# Patient Record
Sex: FEMALE | Race: WHITE | URBAN - METROPOLITAN AREA
[De-identification: names, ages, dates, MRNs, and addresses within clinical notes are randomized per-mention and may not be internally consistent; named-entity substitution may affect disease eponyms.]

---

## 2017-02-15 RX ORDER — OXYCODONE HYDROCHLORIDE AND ACETAMINOPHEN 5; 325 MG/1; MG/1
TABLET ORAL
Refills: 0 | COMMUNITY
Start: 2016-11-25 | End: 2017-02-24

## 2017-02-15 RX ORDER — CONJUGATED ESTROGENS 0.62 MG/G
CREAM VAGINAL
Qty: 30 G | Refills: 1 | Status: SHIPPED | OUTPATIENT
Start: 2017-02-15 | End: 2017-02-24

## 2017-02-15 RX ORDER — ETANERCEPT 50 MG/ML
50 SOLUTION SUBCUTANEOUS WEEKLY
COMMUNITY
Start: 2016-11-18 | End: 2018-02-28

## 2017-02-15 RX ORDER — OMEPRAZOLE 20 MG/1
20 CAPSULE, DELAYED RELEASE ORAL DAILY
COMMUNITY
Start: 2007-04-04 | End: 2017-02-24

## 2017-02-15 RX ORDER — TRIAMTERENE AND HYDROCHLOROTHIAZIDE 37.5; 25 MG/1; MG/1
TABLET ORAL DAILY
COMMUNITY
Start: 2008-07-21 | End: 2017-11-06

## 2017-02-15 RX ORDER — CYANOCOBALAMIN (VITAMIN B-12) 1000 MCG
1 TABLET, EXTENDED RELEASE ORAL 2 TIMES DAILY
COMMUNITY
Start: 2010-08-18

## 2017-02-15 RX ORDER — ESCITALOPRAM OXALATE 10 MG/1
10 TABLET ORAL DAILY
COMMUNITY
Start: 2007-04-04 | End: 2017-04-29

## 2017-02-15 RX ORDER — MULTIVITAMIN
1 CAPSULE ORAL DAILY
COMMUNITY
Start: 2010-09-03 | End: 2018-06-25 | Stop reason: ALTCHOICE

## 2017-02-15 RX ORDER — METOPROLOL SUCCINATE 25 MG/1
25 TABLET, EXTENDED RELEASE ORAL DAILY
COMMUNITY
Start: 2010-10-20 | End: 2017-04-29

## 2017-02-15 RX ORDER — POTASSIUM CHLORIDE 1.5 G/1.77G
20 POWDER, FOR SOLUTION ORAL DAILY
COMMUNITY
Start: 2011-10-28 | End: 2017-08-17

## 2017-02-24 ENCOUNTER — OFFICE VISIT (OUTPATIENT)
Dept: FAMILY MEDICINE CLINIC | Facility: CLINIC | Age: 62
End: 2017-02-24

## 2017-02-24 VITALS
OXYGEN SATURATION: 96 % | DIASTOLIC BLOOD PRESSURE: 72 MMHG | TEMPERATURE: 98 F | HEART RATE: 65 BPM | SYSTOLIC BLOOD PRESSURE: 130 MMHG

## 2017-02-24 DIAGNOSIS — J40 BRONCHITIS: Primary | ICD-10-CM

## 2017-02-24 PROCEDURE — 99214 OFFICE O/P EST MOD 30 MIN: CPT | Performed by: NURSE PRACTITIONER

## 2017-02-24 RX ORDER — AZITHROMYCIN 250 MG/1
TABLET, FILM COATED ORAL
Qty: 6 TABLET | Refills: 0 | Status: SHIPPED | OUTPATIENT
Start: 2017-02-24 | End: 2017-03-23 | Stop reason: ALTCHOICE

## 2017-02-24 RX ORDER — BENZONATATE 200 MG/1
200 CAPSULE ORAL 3 TIMES DAILY PRN
Qty: 30 CAPSULE | Refills: 1 | Status: SHIPPED | OUTPATIENT
Start: 2017-02-24 | End: 2017-03-23 | Stop reason: ALTCHOICE

## 2017-02-24 NOTE — PROGRESS NOTES
CHIEF COMPLAINT:   Patient presents with:  Cough      HPI:   Beti Johnson is a 57year old female who presents to clinic today with complaints of cough for 10 days - feeling some pressure, mild sob- not like when she had blood clot   Had a blood clot in Alcohol Use: No                 REVIEW OF SYSTEMS:   GENERAL: See HPI  SKIN: no unusual skin lesions or rashes  HEENT: See HPI  THYROID: normal size, no nodules  LUNGS: see HPI.   CARDIOVASCULAR: No chest pain, palpitations  GI: No N/V/C/D (ZITHROMAX Z-ELENA) 250 MG Oral Tab 6 tablet 0      Sig: Take two tablets by mouth today, then one tablet daily. benzonatate 200 MG Oral Cap 30 capsule 1      Sig: Take 1 capsule (200 mg total) by mouth 3 (three) times daily as needed for cough.

## 2017-02-24 NOTE — PATIENT INSTRUCTIONS
Rest, increase fluids, salt water gargles ,neti pot (use distilled water) or saline nasal spray, Mucinex, ok to take tessalon pearls as needed for cough suppression. Tylenol/Ibuprofen, follow up if symptoms persist or increase.

## 2017-03-06 ENCOUNTER — TELEPHONE (OUTPATIENT)
Dept: FAMILY MEDICINE CLINIC | Facility: CLINIC | Age: 62
End: 2017-03-06

## 2017-03-06 RX ORDER — ASPIRIN 325 MG
325 TABLET ORAL DAILY
COMMUNITY
End: 2017-03-29

## 2017-03-06 NOTE — TELEPHONE ENCOUNTER
Med list updated.     Future Appointments  Date Time Provider Syd Koch   3/29/2017 5:45 PM Vashti Vogel MD [facility] [place] [facility]   4/12/2017 5:30 PM Vashti Vogel MD [facility] [place] [facility]

## 2017-03-06 NOTE — TELEPHONE ENCOUNTER
Pt was seen for follow up on 1/18/17- was told to take Eliquis for 6 full months before starting ASA 325mg dose. Pt  Wanted to confirm- if that was once daily or BID? Chiquita Rivera Pt informed according to note- once per day.

## 2017-03-21 ENCOUNTER — TELEPHONE (OUTPATIENT)
Dept: FAMILY MEDICINE CLINIC | Facility: CLINIC | Age: 62
End: 2017-03-21

## 2017-03-21 NOTE — TELEPHONE ENCOUNTER
Pt stopped Eliquis end of Feb. Pt started ASA 325mg once daily starting 3/1.  Pt states she noticed some swelling of L lower leg after one wk   being on ASA, but this last wk pt L leg ( foot up to mid calf) with increased swelling, states leg is burning, al

## 2017-03-22 ENCOUNTER — TELEPHONE (OUTPATIENT)
Dept: FAMILY MEDICINE CLINIC | Facility: CLINIC | Age: 62
End: 2017-03-22

## 2017-03-22 NOTE — TELEPHONE ENCOUNTER
Pt with positive finding for DVT L leg- knee area- went to ER yesterday  Pt is back on Eliquis 10mg BID for one wk then will take 5mg BID. Pt states that swelling has receded slightly - no infection was found.   Pt currently has appt on 3/29-  Okay to wait

## 2017-03-23 ENCOUNTER — OFFICE VISIT (OUTPATIENT)
Dept: FAMILY MEDICINE CLINIC | Facility: CLINIC | Age: 62
End: 2017-03-23

## 2017-03-23 VITALS
DIASTOLIC BLOOD PRESSURE: 74 MMHG | OXYGEN SATURATION: 98 % | TEMPERATURE: 98 F | SYSTOLIC BLOOD PRESSURE: 128 MMHG | HEART RATE: 64 BPM | RESPIRATION RATE: 20 BRPM

## 2017-03-23 DIAGNOSIS — I82.412 ACUTE DEEP VEIN THROMBOSIS (DVT) OF FEMORAL VEIN OF LEFT LOWER EXTREMITY (HCC): Primary | ICD-10-CM

## 2017-03-23 PROCEDURE — 99213 OFFICE O/P EST LOW 20 MIN: CPT | Performed by: FAMILY MEDICINE

## 2017-03-23 RX ORDER — OMEPRAZOLE 20 MG/1
20 CAPSULE, DELAYED RELEASE ORAL EVERY MORNING
COMMUNITY
End: 2017-03-26

## 2017-03-23 RX ORDER — NABUMETONE 500 MG/1
500 TABLET, FILM COATED ORAL
COMMUNITY
End: 2017-03-29

## 2017-03-23 NOTE — PROGRESS NOTES
[facility] [place]  PROGRESS NOTE  Chief Complaint:   Patient presents with:  Hospital F/U: leg swelling, blood clot in left leg       HPI:   This is a 57year old female presents for ER follow up.  Patient was seen at [facility] ER on 3/21/17 du Diabetes Paternal Grandfather    • Cancer Paternal Grandfather    • Cancer Brother    • Diabetes Brother      Allergies:    Cefaclor                    Comment:Other reaction(s): vomiting , diarrhea  Keflex                      Comment:Other reaction(s): v or at rest.  RESPIRATORY:  Denies shortness of breath, wheezing, cough or sputum. GASTROINTESTINAL:  Denies abdominal pain, nausea, vomiting, constipation, diarrhea, or blood in stool.     MUSCULOSKELETAL:  Denies weakness, muscle aches, back pain, joint p f/u.    Diagnoses and all orders for this visit:    Acute deep vein thrombosis (DVT) of femoral vein of left lower extremity ([facility] [address]     patient will discuss hypercoagulable labs  with Dr. Patricia Sorensen next week. Patient Instructions   Continue with Eliquis.

## 2017-03-23 NOTE — PATIENT INSTRUCTIONS
Continue with Eliquis. Monitor for any sign of bruising. Call or go to ER if any chest pain, shortness of breath, or any increase in heart rate. Keep appointment with Dr Anneliese Hull next week.

## 2017-03-27 RX ORDER — POTASSIUM CHLORIDE 20 MEQ/1
TABLET, EXTENDED RELEASE ORAL
Qty: 90 TABLET | Refills: 0 | Status: SHIPPED | OUTPATIENT
Start: 2017-03-27 | End: 2017-03-29

## 2017-03-27 RX ORDER — OMEPRAZOLE 20 MG/1
CAPSULE, DELAYED RELEASE ORAL
Qty: 90 CAPSULE | Refills: 0 | Status: SHIPPED | OUTPATIENT
Start: 2017-03-27 | End: 2017-06-24

## 2017-03-29 ENCOUNTER — OFFICE VISIT (OUTPATIENT)
Dept: FAMILY MEDICINE CLINIC | Facility: CLINIC | Age: 62
End: 2017-03-29

## 2017-03-29 VITALS
TEMPERATURE: 98 F | RESPIRATION RATE: 16 BRPM | DIASTOLIC BLOOD PRESSURE: 74 MMHG | HEART RATE: 78 BPM | SYSTOLIC BLOOD PRESSURE: 128 MMHG

## 2017-03-29 DIAGNOSIS — D68.9 COAGULOPATHY (HCC): Primary | ICD-10-CM

## 2017-03-29 DIAGNOSIS — I82.432 ACUTE DEEP VEIN THROMBOSIS (DVT) OF POPLITEAL VEIN OF LEFT LOWER EXTREMITY (HCC): ICD-10-CM

## 2017-03-29 DIAGNOSIS — I26.99 PE (PULMONARY THROMBOEMBOLISM) (HCC): ICD-10-CM

## 2017-03-29 PROCEDURE — 99213 OFFICE O/P EST LOW 20 MIN: CPT | Performed by: FAMILY MEDICINE

## 2017-03-29 NOTE — PATIENT INSTRUCTIONS
Pt to consult Dr. Bowden Can re recurrent DVT.  And prior PE  Pt also needing preop for cataract    Pt to f.u with me after hematology eval

## 2017-03-29 NOTE — PROGRESS NOTES
Awais Navas is a 57year old female. HPI:   Patient presents for recheck of her DVT. Pt has been taking medications as instructed, no medication side effects. Pt with mild cough nagging headache, some fatigue now back on medication.  Is unsure she Comment x3 in L knee and x1 in R knee    OTHER      Comment Tendon repair to R hand      Social History:  Smoking: Denies     No results found for this or any previous visit.   REVIEW OF SYSTEMS:   GENERAL HEALTH: feels well otherwise  SKIN: denies any unus

## 2017-04-12 ENCOUNTER — OFFICE VISIT (OUTPATIENT)
Dept: FAMILY MEDICINE CLINIC | Facility: CLINIC | Age: 62
End: 2017-04-12

## 2017-04-12 VITALS
RESPIRATION RATE: 18 BRPM | TEMPERATURE: 98 F | WEIGHT: 226.25 LBS | OXYGEN SATURATION: 98 % | SYSTOLIC BLOOD PRESSURE: 138 MMHG | DIASTOLIC BLOOD PRESSURE: 84 MMHG | HEART RATE: 66 BPM

## 2017-04-12 DIAGNOSIS — Z01.818 PREOPERATIVE EXAMINATION: Primary | ICD-10-CM

## 2017-04-12 DIAGNOSIS — I82.432 ACUTE DEEP VEIN THROMBOSIS (DVT) OF POPLITEAL VEIN OF LEFT LOWER EXTREMITY (HCC): ICD-10-CM

## 2017-04-12 DIAGNOSIS — H25.9 AGE-RELATED CATARACT OF BOTH EYES, UNSPECIFIED AGE-RELATED CATARACT TYPE: ICD-10-CM

## 2017-04-12 DIAGNOSIS — I10 BENIGN ESSENTIAL HYPERTENSION: ICD-10-CM

## 2017-04-12 DIAGNOSIS — I26.99 PE (PULMONARY THROMBOEMBOLISM) (HCC): ICD-10-CM

## 2017-04-12 DIAGNOSIS — M05.79 RHEUMATOID ARTHRITIS INVOLVING MULTIPLE SITES WITH POSITIVE RHEUMATOID FACTOR (HCC): ICD-10-CM

## 2017-04-12 PROBLEM — H26.9 CATARACT: Status: ACTIVE | Noted: 2017-04-12

## 2017-04-12 PROCEDURE — 93000 ELECTROCARDIOGRAM COMPLETE: CPT | Performed by: FAMILY MEDICINE

## 2017-04-12 PROCEDURE — 99214 OFFICE O/P EST MOD 30 MIN: CPT | Performed by: FAMILY MEDICINE

## 2017-04-13 NOTE — H&P
[facility] [place]  PRE-OP NOTE    HPI:   Satish Martinez is a 57year old female with a hx of bilateral cataracts, who presents for a pre-operative physical exam. Patient is to have bilateral cataract repair, to by done by Dr. Makenzie Ching at [facility] wheel chair   • Shoulder fracture, right 2010   • Stress fracture of foot    • Osteoporosis    • Psoriatic arthritis ([facility] [address]    • Sjogren's syndrome (HCC)          Past Surgical History    TOTAL HIP REPLACEMENT      Comment x4 in L hip    TOTAL KNEE REPLACEM legs and low back. NEUROLOGICAL:  Denies headache, seizures, dizziness, syncope, paralysis, ataxia, numbness or tingling in the extremities,change in bowel or bladder control. HEMATOLOGIC: She has a hypercoagulable state.   She has had 2 previous pulmonar is to have right and left cataract repair, to by done by Dr. Tonja Powell at [facility] on April 20 and May 4. She had a preoperative EKG completed on 4/12/2017. It does not show any abnormalities that would preclude surgery.     She has received preoperat

## 2017-04-19 ENCOUNTER — MED REC SCAN ONLY (OUTPATIENT)
Dept: FAMILY MEDICINE CLINIC | Facility: CLINIC | Age: 62
End: 2017-04-19

## 2017-04-24 ENCOUNTER — OFFICE VISIT (OUTPATIENT)
Dept: FAMILY MEDICINE CLINIC | Facility: CLINIC | Age: 62
End: 2017-04-24

## 2017-04-24 ENCOUNTER — TELEPHONE (OUTPATIENT)
Dept: FAMILY MEDICINE CLINIC | Facility: CLINIC | Age: 62
End: 2017-04-24

## 2017-04-24 VITALS
SYSTOLIC BLOOD PRESSURE: 140 MMHG | DIASTOLIC BLOOD PRESSURE: 78 MMHG | HEART RATE: 74 BPM | RESPIRATION RATE: 18 BRPM | OXYGEN SATURATION: 96 % | TEMPERATURE: 98 F

## 2017-04-24 DIAGNOSIS — M79.89 LEFT LEG SWELLING: Primary | ICD-10-CM

## 2017-04-24 DIAGNOSIS — Z86.718 HISTORY OF DEEP VENOUS THROMBOSIS (DVT) OF DISTAL VEIN OF LEFT LOWER EXTREMITY: ICD-10-CM

## 2017-04-24 PROCEDURE — 99213 OFFICE O/P EST LOW 20 MIN: CPT | Performed by: FAMILY MEDICINE

## 2017-04-24 RX ORDER — GATIFLOXACIN 5 MG/ML
1 SOLUTION/ DROPS OPHTHALMIC 4 TIMES DAILY
Refills: 1 | COMMUNITY
Start: 2017-04-13 | End: 2017-08-17

## 2017-04-24 RX ORDER — TRIPROLIDINE/PSEUDOEPHEDRINE 2.5MG-60MG
1 TABLET ORAL 4 TIMES DAILY
Refills: 1 | COMMUNITY
Start: 2017-04-13 | End: 2017-08-17

## 2017-04-24 NOTE — TELEPHONE ENCOUNTER
Pt called states she had cataract surgery last week, was told to hold diuretic day of procedure. Pt states she noticed L leg swelling Friday and this weekend. Pt states she had blood clot diagnosed recently-on 3/23, had gone to Dr. Anastasia De Leon as well.  Pt is

## 2017-04-24 NOTE — PROGRESS NOTES
[facility] [place]  PROGRESS NOTE  Chief Complaint:   Patient presents with:  Leg Swelling: Left leg swelling, does have a little pain      HPI:   This is a 57year old female coming in for left leg swelling and pain.   She had her cataract surge Paternal Grandfather    • Cancer Paternal Grandfather    • Cancer Brother    • Diabetes Brother      Allergies:    Cefaclor                    Comment:Other reaction(s): vomiting , diarrhea  Keflex                      Comment:Other reaction(s): vomiting , visual loss, blurred vision, double vision or yellow sclerae. Ears, Nose, Throat:  Denies hearing loss, sneezing, congestion, runny nose or sore throat. INTEGUMENTARY:  Denies rashes, itching, skin lesion, or excessive skin dryness.   CARDIOVASCULAR:  Evelena Nasuti symptoms are so similar to the way she felt when she had a previous DVT that certainly must be considered. Plan: To [facility] this evening for a venous Doppler.   If the venous Doppler is negative we will assume that this is related to a minor in right     Goiter     Benign paroxysmal positional vertigo     Coagulopathy (HCC)     PE (pulmonary thromboembolism) ([address]     Acute deep vein thrombosis (DVT) of popliteal vein of left lower extremity (HCC)     Preoperative examination     Cataract     Lef

## 2017-04-24 NOTE — PATIENT INSTRUCTIONS
Venous doppler at [facility]. If normal, no change in medications. If clot found, please make an appt to meet with Dr Vicki Beck to discuss medications.

## 2017-04-29 RX ORDER — METOPROLOL SUCCINATE 25 MG/1
TABLET, EXTENDED RELEASE ORAL
Qty: 90 TABLET | Refills: 3 | Status: SHIPPED | OUTPATIENT
Start: 2017-04-29 | End: 2018-02-19

## 2017-04-29 RX ORDER — ESCITALOPRAM OXALATE 10 MG/1
TABLET ORAL
Qty: 90 TABLET | Refills: 3 | Status: SHIPPED | OUTPATIENT
Start: 2017-04-29 | End: 2018-02-19

## 2017-06-17 ENCOUNTER — MED REC SCAN ONLY (OUTPATIENT)
Dept: FAMILY MEDICINE CLINIC | Facility: CLINIC | Age: 62
End: 2017-06-17

## 2017-06-25 RX ORDER — POTASSIUM CHLORIDE 20 MEQ/1
TABLET, EXTENDED RELEASE ORAL
Qty: 90 TABLET | Refills: 0 | Status: SHIPPED | OUTPATIENT
Start: 2017-06-25 | End: 2017-09-24

## 2017-06-25 RX ORDER — OMEPRAZOLE 20 MG/1
20 CAPSULE, DELAYED RELEASE ORAL
Qty: 90 CAPSULE | Refills: 0 | Status: SHIPPED | OUTPATIENT
Start: 2017-06-25 | End: 2017-09-24

## 2017-08-17 ENCOUNTER — OFFICE VISIT (OUTPATIENT)
Dept: FAMILY MEDICINE CLINIC | Facility: CLINIC | Age: 62
End: 2017-08-17

## 2017-08-17 VITALS
SYSTOLIC BLOOD PRESSURE: 110 MMHG | TEMPERATURE: 99 F | HEART RATE: 76 BPM | DIASTOLIC BLOOD PRESSURE: 80 MMHG | RESPIRATION RATE: 16 BRPM

## 2017-08-17 DIAGNOSIS — J30.2 CHRONIC SEASONAL ALLERGIC RHINITIS, UNSPECIFIED TRIGGER: ICD-10-CM

## 2017-08-17 DIAGNOSIS — H60.332 ACUTE SWIMMER'S EAR OF LEFT SIDE: Primary | ICD-10-CM

## 2017-08-17 PROCEDURE — 99213 OFFICE O/P EST LOW 20 MIN: CPT | Performed by: NURSE PRACTITIONER

## 2017-08-17 RX ORDER — NEOMYCIN SULFATE, POLYMYXIN B SULFATE, HYDROCORTISONE 3.5; 10000; 1 MG/ML; [USP'U]/ML; MG/ML
4 SOLUTION/ DROPS AURICULAR (OTIC) 4 TIMES DAILY
Qty: 10 ML | Refills: 0 | Status: SHIPPED | OUTPATIENT
Start: 2017-08-17 | End: 2017-11-22 | Stop reason: ALTCHOICE

## 2017-08-17 RX ORDER — VIT A/VIT C/VIT E/ZINC/COPPER 2148-113
1 TABLET ORAL 2 TIMES DAILY
COMMUNITY

## 2017-08-17 NOTE — PROGRESS NOTES
HPI:    Patient ID: Darlene Torrez is a 57year old female. HPI   Present with complaints of left ear pain that is getting worse over the past couple of days. States allergies have been bothering her for the past week. No fever or chills.      Review of vomiting , diarrhea  Keflex                      Comment:Other reaction(s): vomiting , diarrhea  Phenylbutazone              Comment:Other reaction(s): sores in mouth  Sulfa Antibiotics       Rash  Hydroxychloroquine          Comment:Other reaction(s): Oth Refills    Neomycin-Polymyxin-HC (CORTISPORIN) 1 % Otic Solution 10 mL 0      Sig: Place 4 drops in ear(s) 4 (four) times daily. Imaging & Referrals:  None    Patient Instructions   Use drops as directed.    Treat allergies - consider adding Nasan

## 2017-08-17 NOTE — PATIENT INSTRUCTIONS
Use drops as directed. Treat allergies - consider adding Nasanex, Flonase, or other. 2 sprays each nostril in 24 hours. Return to clinic if worsens or not improving. Keep appointment with Dr. Gomez So for next week.

## 2017-08-23 ENCOUNTER — OFFICE VISIT (OUTPATIENT)
Dept: FAMILY MEDICINE CLINIC | Facility: CLINIC | Age: 62
End: 2017-08-23

## 2017-08-23 VITALS
WEIGHT: 230.13 LBS | TEMPERATURE: 99 F | SYSTOLIC BLOOD PRESSURE: 126 MMHG | DIASTOLIC BLOOD PRESSURE: 68 MMHG | HEART RATE: 66 BPM | RESPIRATION RATE: 16 BRPM

## 2017-08-23 DIAGNOSIS — N39.41 URGE INCONTINENCE OF URINE: ICD-10-CM

## 2017-08-23 DIAGNOSIS — R10.9 ABDOMINAL PAIN, UNSPECIFIED ABDOMINAL LOCATION: Primary | ICD-10-CM

## 2017-08-23 LAB
ALBUMIN SERPL-MCNC: 3.5 G/DL (ref 3.5–4.8)
ALP LIVER SERPL-CCNC: 161 U/L (ref 50–130)
ALT SERPL-CCNC: 45 U/L (ref 14–54)
AST SERPL-CCNC: 27 U/L (ref 15–41)
BILIRUB SERPL-MCNC: 0.3 MG/DL (ref 0.1–2)
BILIRUB UR QL STRIP.AUTO: NEGATIVE
BUN BLD-MCNC: 26 MG/DL (ref 8–20)
CALCIUM BLD-MCNC: 9 MG/DL (ref 8.3–10.3)
CHLORIDE: 107 MMOL/L (ref 101–111)
CO2: 27 MMOL/L (ref 22–32)
COLOR UR AUTO: YELLOW
CREAT BLD-MCNC: 0.94 MG/DL (ref 0.55–1.02)
GLUCOSE BLD-MCNC: 107 MG/DL (ref 70–99)
GLUCOSE UR STRIP.AUTO-MCNC: NEGATIVE MG/DL
KETONES UR STRIP.AUTO-MCNC: NEGATIVE MG/DL
M PROTEIN MFR SERPL ELPH: 7.2 G/DL (ref 6.1–8.3)
NITRITE UR QL STRIP.AUTO: NEGATIVE
PH UR STRIP.AUTO: 5 [PH] (ref 4.5–8)
POTASSIUM SERPL-SCNC: 4.4 MMOL/L (ref 3.6–5.1)
PROT UR STRIP.AUTO-MCNC: NEGATIVE MG/DL
RBC #/AREA URNS AUTO: >10 /HPF
RBC UR QL AUTO: NEGATIVE
SODIUM SERPL-SCNC: 141 MMOL/L (ref 136–144)
SP GR UR STRIP.AUTO: 1.02 (ref 1–1.03)
UROBILINOGEN UR STRIP.AUTO-MCNC: <2 MG/DL
WBC #/AREA URNS AUTO: >50 /HPF

## 2017-08-23 PROCEDURE — 81001 URINALYSIS AUTO W/SCOPE: CPT | Performed by: FAMILY MEDICINE

## 2017-08-23 PROCEDURE — 80053 COMPREHEN METABOLIC PANEL: CPT | Performed by: FAMILY MEDICINE

## 2017-08-23 PROCEDURE — 99214 OFFICE O/P EST MOD 30 MIN: CPT | Performed by: FAMILY MEDICINE

## 2017-08-23 PROCEDURE — 87086 URINE CULTURE/COLONY COUNT: CPT | Performed by: FAMILY MEDICINE

## 2017-08-23 RX ORDER — FLUTICASONE PROPIONATE 50 MCG
1 SPRAY, SUSPENSION (ML) NASAL DAILY
COMMUNITY

## 2017-08-23 NOTE — PROGRESS NOTES
[facility] [place]  PROGRESS NOTE  Chief Complaint:   Patient presents with:  Abdominal Pain  Incontinence: Has gotten worse      HPI:   This is a 57year old female coming in [facility] eval.   Pt with sence of urgency when first wakes in am. C mmol/L       Wt Readings from Last 6 Encounters:  08/23/17 : 230 lb 2 oz  04/12/17 : 226 lb 4 oz    Past Medical History:   Diagnosis Date   • Anxiety    • Arthritis    • Essential hypertension    • Osteoporosis    • Psoriatic arthritis ([facility] [address]    • Pulmonar reaction(s): Other (See Comments)             Butazolidin  Current Meds:    Current Outpatient Prescriptions:  Fluticasone Propionate 50 MCG/ACT Nasal Suspension 1 spray by Nasal route daily.  Disp:  Rfl:    Multiple Vitamins-Minerals (PRESERVISION AREDS) O pressure, chest discomfort, palpitations, edema, dyspnea on exertion or at rest.  RESPIRATORY:  Denies shortness of breath, wheezing, cough or sputum. GASTROINTESTINAL:  Denies abdominal pain, nausea, vomiting, constipation, diarrhea, or blood in stool. dorsalis pedis pulses bilaterally. NEURO:  No deficit, normal gait, strength and tone, sensory intact, normal reflexes. ASSESSMENT AND PLAN:   1.  Abdominal pain, unspecified abdominal location  Nonspecific- further eval if worsens or does not improve, Maintenance:        Pamela Simon MD  8/23/2017  5:53 PM    Patient/Caregiver Education: Patient/Caregiver Education: There are no barriers to learning. Medical education done. Outcome: Patient verbalizes understanding.  Patient is notified to call w

## 2017-08-25 ENCOUNTER — TELEPHONE (OUTPATIENT)
Dept: FAMILY MEDICINE CLINIC | Facility: CLINIC | Age: 62
End: 2017-08-25

## 2017-08-25 NOTE — TELEPHONE ENCOUNTER
----- Message from Walt Dawn MD sent at 8/25/2017  9:03 AM CDT -----  Labs reviewed  Urine culture negative  Urine- contaminat with increased sq cells and wbc  Chem panel=elevated BUN, normal renal function--please encourage pt to increase water i

## 2017-08-30 ENCOUNTER — TELEPHONE (OUTPATIENT)
Dept: FAMILY MEDICINE CLINIC | Facility: CLINIC | Age: 62
End: 2017-08-30

## 2017-08-30 RX ORDER — OXYBUTYNIN CHLORIDE 5 MG/1
5 TABLET, EXTENDED RELEASE ORAL DAILY
Qty: 30 TABLET | Refills: 0 | Status: SHIPPED | OUTPATIENT
Start: 2017-08-30 | End: 2017-09-23 | Stop reason: DRUGHIGH

## 2017-08-30 NOTE — TELEPHONE ENCOUNTER
Pt to resume to taking maxide daily. If would like to try medication for incontince  - trial Rx given. Pt to monitor for side effect of dry mouth or dry eyes. Pt to also go to ED if unable to void in 6-8 hours after first dose.   Pt to call response 2 w

## 2017-08-30 NOTE — TELEPHONE ENCOUNTER
Pt was seen on 8/23-  due to pt incontinence concerns, Maxide was decreased to every other day, but pt only held 2 doses. Pt ended up holding dose last Friday and Last Sunday- then noticed swelling.    Pt was urged to monitor BP and swelling at time of vis

## 2017-09-18 ENCOUNTER — TELEPHONE (OUTPATIENT)
Dept: FAMILY MEDICINE CLINIC | Facility: CLINIC | Age: 62
End: 2017-09-18

## 2017-09-18 NOTE — TELEPHONE ENCOUNTER
Regarding medication that dr put her on, Radha Gabriel,  two weeks ago, not working, please leave message or call her at work, [employer]

## 2017-09-18 NOTE — TELEPHONE ENCOUNTER
If pt is not having any side effects, she can try ncrease dose to 5 mg twice a day.  Report back in 7-10 days

## 2017-09-23 RX ORDER — OXYBUTYNIN CHLORIDE 5 MG/1
5 TABLET, EXTENDED RELEASE ORAL 2 TIMES DAILY
COMMUNITY
End: 2017-09-29

## 2017-09-23 NOTE — TELEPHONE ENCOUNTER
Pt called back, informed. Pt states she will go ahead and trial increase dose of Oxybutynin 5mg BID, med list updated. pt will call back with f/u report as per CR recommendation.

## 2017-09-25 ENCOUNTER — TELEPHONE (OUTPATIENT)
Dept: FAMILY MEDICINE CLINIC | Facility: CLINIC | Age: 62
End: 2017-09-25

## 2017-09-25 DIAGNOSIS — I10 HTN (HYPERTENSION), BENIGN: Primary | ICD-10-CM

## 2017-09-25 DIAGNOSIS — R73.09 ELEVATED GLUCOSE: ICD-10-CM

## 2017-09-25 RX ORDER — OMEPRAZOLE 20 MG/1
CAPSULE, DELAYED RELEASE ORAL
Qty: 90 CAPSULE | Refills: 1 | Status: SHIPPED | OUTPATIENT
Start: 2017-09-25 | End: 2018-03-24

## 2017-09-25 RX ORDER — POTASSIUM CHLORIDE 20 MEQ/1
TABLET, EXTENDED RELEASE ORAL
Qty: 90 TABLET | Refills: 1 | Status: SHIPPED | OUTPATIENT
Start: 2017-09-25 | End: 2018-02-21

## 2017-09-25 NOTE — TELEPHONE ENCOUNTER
Future appt:    Last Appointment:  8/23/2017 (seen for ab pain)  Last med check: 3/29/17  Last lab:  8/23/17 (had CMP checked)  Last refills: 6/25/17 (3 months given at that time)    Called pt-  Will be due for annual exam after 11/18/17-  Asked pt to call

## 2017-09-25 NOTE — TELEPHONE ENCOUNTER
Future Appointments  Date Time Provider Syd Drakei   11/16/2017 9:15 AM RE[facility] [place] [facility]   11/22/2017 3:00 PM Marce Beth MD [facility] [place] [facility] [place]     Lab orders please.

## 2017-09-26 NOTE — TELEPHONE ENCOUNTER
Future Appointments  Date Time Provider Syd Zee   11/16/2017 9:15 AM [facility] [place] [facility]   11/22/2017 3:00 PM Sukhwinder Cutler MD [facility] [place] [facility] [place]

## 2017-09-29 ENCOUNTER — TELEPHONE (OUTPATIENT)
Dept: FAMILY MEDICINE CLINIC | Facility: CLINIC | Age: 62
End: 2017-09-29

## 2017-09-29 RX ORDER — OXYBUTYNIN CHLORIDE 5 MG/1
5 TABLET, EXTENDED RELEASE ORAL 2 TIMES DAILY
Qty: 180 TABLET | Refills: 1 | Status: SHIPPED | OUTPATIENT
Start: 2017-09-29 | End: 2018-03-24

## 2017-09-29 NOTE — TELEPHONE ENCOUNTER
Pt calling with update- re: Oxybutynin. Pt dose was increased to 5mg BID (pt takes at 7am and 8pm). Pt states she is noticing \"50%\" improvement-    Pt states days are not a problem, has continues issues at night,  Pt is up every 3 hours.   Pt states sh

## 2017-10-06 ENCOUNTER — TELEPHONE (OUTPATIENT)
Dept: FAMILY MEDICINE CLINIC | Facility: CLINIC | Age: 62
End: 2017-10-06

## 2017-10-06 RX ORDER — PREDNISONE 1 MG/1
5 TABLET ORAL DAILY
COMMUNITY
End: 2018-08-15 | Stop reason: DRUGHIGH

## 2017-10-06 NOTE — TELEPHONE ENCOUNTER
Went to [facility] to get her flu shot after putting all of her information in the system , they said that they couldn't do it and that she needs to contact her physician. Can she have a flu shot?

## 2017-10-06 NOTE — TELEPHONE ENCOUNTER
Pt states she went to pharmacy for her flu vaccine. Pt states after they checked her meds, they told pt that they could not give her the flu vaccine and to call PCP. Pt is on Enbrel and was recently started Prednisone.   Discussed with CR- JOSHUA advised that

## 2017-11-06 RX ORDER — TRIAMTERENE AND HYDROCHLOROTHIAZIDE 37.5; 25 MG/1; MG/1
TABLET ORAL
Qty: 90 TABLET | Refills: 0 | Status: SHIPPED | OUTPATIENT
Start: 2017-11-06 | End: 2018-02-03

## 2017-11-06 NOTE — TELEPHONE ENCOUNTER
Future appt:     Your appointments     Date & Time Appointment Department [facility])    Nov 16, 2017  9:15 AM CST Laboratory Visit with REF [facility] (ED[facility] Ref Lab [facility])    Nov 22, 2017  3:00 PM CST Physical - Established Patient with R

## 2017-11-15 RX ORDER — APIXABAN 5 MG/1
TABLET, FILM COATED ORAL
Qty: 90 TABLET | Refills: 0 | Status: SHIPPED | OUTPATIENT
Start: 2017-11-15 | End: 2017-12-30

## 2017-11-16 ENCOUNTER — LABORATORY ENCOUNTER (OUTPATIENT)
Dept: LAB | Age: 62
End: 2017-11-16
Attending: FAMILY MEDICINE
Payer: COMMERCIAL

## 2017-11-16 DIAGNOSIS — R73.09 ELEVATED GLUCOSE: ICD-10-CM

## 2017-11-16 DIAGNOSIS — I10 HTN (HYPERTENSION), BENIGN: ICD-10-CM

## 2017-11-16 PROCEDURE — 87086 URINE CULTURE/COLONY COUNT: CPT | Performed by: FAMILY MEDICINE

## 2017-11-16 PROCEDURE — 36415 COLL VENOUS BLD VENIPUNCTURE: CPT | Performed by: FAMILY MEDICINE

## 2017-11-16 PROCEDURE — 83036 HEMOGLOBIN GLYCOSYLATED A1C: CPT | Performed by: FAMILY MEDICINE

## 2017-11-16 PROCEDURE — 80061 LIPID PANEL: CPT | Performed by: FAMILY MEDICINE

## 2017-11-16 PROCEDURE — 81001 URINALYSIS AUTO W/SCOPE: CPT | Performed by: FAMILY MEDICINE

## 2017-11-16 PROCEDURE — 80050 GENERAL HEALTH PANEL: CPT | Performed by: FAMILY MEDICINE

## 2017-11-22 ENCOUNTER — OFFICE VISIT (OUTPATIENT)
Dept: FAMILY MEDICINE CLINIC | Facility: CLINIC | Age: 62
End: 2017-11-22

## 2017-11-22 VITALS
TEMPERATURE: 98 F | SYSTOLIC BLOOD PRESSURE: 126 MMHG | DIASTOLIC BLOOD PRESSURE: 64 MMHG | HEART RATE: 72 BPM | WEIGHT: 231.13 LBS | RESPIRATION RATE: 18 BRPM

## 2017-11-22 DIAGNOSIS — I82.432 ACUTE DEEP VEIN THROMBOSIS (DVT) OF POPLITEAL VEIN OF LEFT LOWER EXTREMITY (HCC): ICD-10-CM

## 2017-11-22 DIAGNOSIS — M15.9 PRIMARY OSTEOARTHRITIS INVOLVING MULTIPLE JOINTS: ICD-10-CM

## 2017-11-22 DIAGNOSIS — M05.79 RHEUMATOID ARTHRITIS INVOLVING MULTIPLE SITES WITH POSITIVE RHEUMATOID FACTOR (HCC): ICD-10-CM

## 2017-11-22 DIAGNOSIS — I10 BENIGN ESSENTIAL HYPERTENSION: ICD-10-CM

## 2017-11-22 DIAGNOSIS — K76.0 FATTY LIVER: ICD-10-CM

## 2017-11-22 DIAGNOSIS — Z00.00 ANNUAL PHYSICAL EXAM: Primary | ICD-10-CM

## 2017-11-22 DIAGNOSIS — N72 CERVICITIS AND ENDOCERVICITIS: ICD-10-CM

## 2017-11-22 DIAGNOSIS — E87.6 HYPOPOTASSEMIA: ICD-10-CM

## 2017-11-22 DIAGNOSIS — M79.89 LEFT LEG SWELLING: ICD-10-CM

## 2017-11-22 DIAGNOSIS — I26.99 PE (PULMONARY THROMBOEMBOLISM) (HCC): ICD-10-CM

## 2017-11-22 DIAGNOSIS — N72 CERVICITIS: ICD-10-CM

## 2017-11-22 DIAGNOSIS — I26.99 PULMONARY INFARCTION (HCC): ICD-10-CM

## 2017-11-22 PROBLEM — Z01.818 PREOPERATIVE EXAMINATION: Status: RESOLVED | Noted: 2017-04-12 | Resolved: 2017-11-22

## 2017-11-22 PROCEDURE — 87660 TRICHOMONAS VAGIN DIR PROBE: CPT | Performed by: FAMILY MEDICINE

## 2017-11-22 PROCEDURE — 99213 OFFICE O/P EST LOW 20 MIN: CPT | Performed by: FAMILY MEDICINE

## 2017-11-22 PROCEDURE — 87480 CANDIDA DNA DIR PROBE: CPT | Performed by: FAMILY MEDICINE

## 2017-11-22 PROCEDURE — 99396 PREV VISIT EST AGE 40-64: CPT | Performed by: FAMILY MEDICINE

## 2017-11-22 PROCEDURE — 93000 ELECTROCARDIOGRAM COMPLETE: CPT | Performed by: FAMILY MEDICINE

## 2017-11-22 PROCEDURE — 87510 GARDNER VAG DNA DIR PROBE: CPT | Performed by: FAMILY MEDICINE

## 2017-11-22 PROCEDURE — 88175 CYTOPATH C/V AUTO FLUID REDO: CPT | Performed by: FAMILY MEDICINE

## 2017-11-22 NOTE — PROGRESS NOTES
[facility] [place]  PROGRESS NOTE  Chief Complaint:   Patient presents with:  Physical      HPI:   This is a 57year old female coming in for annual physical exam.  Patient with limited mobility and was examined in the procedure room where there 5.500 mIU/mL   -URINALYSIS WITH CULTURE REFLEX   Result Value Ref Range   Urine Color Yellow Yellow   Clarity Urine Hazy (A) Clear   Spec Gravity 1.016 1.001 - 1.030   Glucose Urine Negative Negative mg/dl   Bilirubin Urine Negative Negative   Ketones Urin oz    Past Medical History:   Diagnosis Date   • Anxiety    • Arthritis    • Essential hypertension    • Osteoporosis    • Psoriatic arthritis [facility])    • Pulmonary emboli ([facility] [address] Aug, 2016    right lung   • Rheumatoid arthritis ([facility] [address]     in wheel chair   • S Prescriptions:  ELIQUIS 5 MG Oral Tab TAKE 1 TABLET(5 MG) BY MOUTH TWICE DAILY Disp: 90 tablet Rfl: 0   TRIAMTERENE-HCTZ 37.5-25 MG Oral Tab TAKE 1 TABLET BY MOUTH EVERY DAY Disp: 90 tablet Rfl: 0   predniSONE 5 MG Oral Tab Take 5 mg by mouth daily.  Disp: exertion or at rest.  RESPIRATORY:  Denies shortness of breath, wheezing, cough or sputum. GASTROINTESTINAL:  Denies abdominal pain, nausea, vomiting, constipation, diarrhea, or blood in stool.   MUSCULOSKELETAL:  Denies weakness, muscle aches, back pain, cyanosis, no clubbing, FROM, 2+ dorsalis pedis pulses bilaterally. Left shoulder with decreased range of motion she can abduct to about 90.   Patient with discomfort with further range of motion  NEURO:  No deficit, normal gait, strength and tone, sensory rheumatoid factor (HCC)     Injury of shoulder, right     Goiter     Benign paroxysmal positional vertigo     Coagulopathy (HCC)     PE (pulmonary thromboembolism) ([facility] [address]     Acute deep vein thrombosis (DVT) of popliteal vein of left lower extremity ([facility]

## 2017-11-22 NOTE — PATIENT INSTRUCTIONS
Continue medications    ekg- stable    Pap and vaginal culture today    Trial decrease oxybutinin - if symptom go back on    Increase potassium to 2 x a day through weekend then once a day

## 2017-11-24 ENCOUNTER — TELEPHONE (OUTPATIENT)
Dept: FAMILY MEDICINE CLINIC | Facility: CLINIC | Age: 62
End: 2017-11-24

## 2017-11-24 RX ORDER — METRONIDAZOLE 500 MG/1
500 TABLET ORAL 3 TIMES DAILY
Qty: 30 TABLET | Refills: 0 | Status: SHIPPED | OUTPATIENT
Start: 2017-11-24 | End: 2017-12-12 | Stop reason: ALTCHOICE

## 2017-11-24 NOTE — TELEPHONE ENCOUNTER
----- Message from Micaela Bucio MD sent at 11/24/2017  9:02 AM CST -----  Vaginal culture positive for Gardnerella. Patient recommended for antibiotic therapy.   I recommend Flagyl however due to patient's disabilities I think oral will be more effec

## 2017-11-29 ENCOUNTER — TELEPHONE (OUTPATIENT)
Dept: FAMILY MEDICINE CLINIC | Facility: CLINIC | Age: 62
End: 2017-11-29

## 2017-11-29 NOTE — TELEPHONE ENCOUNTER
----- Message from Real Reagan MD sent at 11/29/2017  8:35 AM CST -----  Normal Pap smear. Negative HPV. Patient with positive vaginitis testing.   Patient to complete treatment if she has any further discharge or pelvic discomfort she should retur

## 2017-12-04 ENCOUNTER — TELEPHONE (OUTPATIENT)
Dept: FAMILY MEDICINE CLINIC | Facility: CLINIC | Age: 62
End: 2017-12-04

## 2017-12-04 NOTE — TELEPHONE ENCOUNTER
Patient states she has developed a pressure sore on left big toe. Patient states she has nerve damage on that foot.    Patient was wondering if she could get a referral. Informed patient she should be seen by dr. Denisha Gutierrez first. Expressed understanding     You

## 2017-12-05 ENCOUNTER — PATIENT OUTREACH (OUTPATIENT)
Dept: FAMILY MEDICINE CLINIC | Facility: CLINIC | Age: 62
End: 2017-12-05

## 2017-12-12 ENCOUNTER — OFFICE VISIT (OUTPATIENT)
Dept: FAMILY MEDICINE CLINIC | Facility: CLINIC | Age: 62
End: 2017-12-12

## 2017-12-12 VITALS
TEMPERATURE: 98 F | RESPIRATION RATE: 16 BRPM | SYSTOLIC BLOOD PRESSURE: 110 MMHG | DIASTOLIC BLOOD PRESSURE: 60 MMHG | HEART RATE: 60 BPM

## 2017-12-12 DIAGNOSIS — L89.893 PRESSURE ULCER OF TOE OF LEFT FOOT, STAGE 3 (HCC): ICD-10-CM

## 2017-12-12 DIAGNOSIS — L03.032 CELLULITIS OF TOE OF LEFT FOOT: Primary | ICD-10-CM

## 2017-12-12 PROCEDURE — 99214 OFFICE O/P EST MOD 30 MIN: CPT | Performed by: FAMILY MEDICINE

## 2017-12-12 RX ORDER — CLINDAMYCIN HYDROCHLORIDE 300 MG/1
300 CAPSULE ORAL 3 TIMES DAILY
COMMUNITY
Start: 2017-12-10 | End: 2018-02-28 | Stop reason: ALTCHOICE

## 2017-12-12 NOTE — PROGRESS NOTES
[facility] [place]  PROGRESS NOTE  Chief Complaint:   Patient presents with:  Wound: Patient stated she has a pressure would on the left foot and went to the ER for it.       HPI:   This is a 57year old female coming in for medical eval    Pt wi [facility] REMOVED]    Case Report Gynecologic Cytology                              Case: [id number]                                  Authorizing Provider:  Manuela Mcarthur MD     Collected:           11/22/2017 03:52 PM          Ordering Location: Smokeless tobacco: Never Used                        Alcohol use: No              Drug use:  No              Family History:  Family History   Problem Relation Age of Onset   • Cancer Father    • Hypertension Father    • Cancer AREDS) Oral Tab Take 1 tablet by mouth 2 (two) times daily.  Disp:  Rfl:    METOPROLOL SUCCINATE ER 25 MG Oral Tablet 24 Hr TAKE 1 TABLET BY MOUTH DAILY Disp: 90 tablet Rfl: 3   ESCITALOPRAM 10 MG Oral Tab TAKE 1 TABLET BY MOUTH EVERY DAY Disp: 90 tablet Rf /60 (BP Location: Left arm, Patient Position: Sitting, Cuff Size: large)   Pulse 60   Temp 98.1 °F (36.7 °C) (Temporal)   Resp 16  There is no height or weight on file to calculate BMI. Vital signs reviewed. Appears stated age, well groomed.   Phys foot      Patient Instructions   Ok for driving handicap [facility]    Wound clinic referral-- appt tomorrow [id number] physician group)    Continue antibiotic    Patient on Eliquis for prevention of blood clots.         Meds

## 2017-12-12 NOTE — PATIENT INSTRUCTIONS
[id number] [address] for driving handicap placcard    Wound clinic referral-- appt tomorrow [id number] [facility] [place] physician group)    Continue antibiotic

## 2017-12-30 RX ORDER — APIXABAN 5 MG/1
TABLET, FILM COATED ORAL
Qty: 90 TABLET | Refills: 0 | Status: SHIPPED | OUTPATIENT
Start: 2017-12-30 | End: 2018-02-11

## 2018-02-03 RX ORDER — TRIAMTERENE AND HYDROCHLOROTHIAZIDE 37.5; 25 MG/1; MG/1
TABLET ORAL
Qty: 90 TABLET | Refills: 3 | Status: SHIPPED | OUTPATIENT
Start: 2018-02-03 | End: 2018-08-15

## 2018-02-03 NOTE — TELEPHONE ENCOUNTER
Future appt:  None  Last Appointment:  12/12/2017; No f/u recommended    Cholesterol, Total (mg/dL)   Date Value   11/16/2017 123   ----------  HDL Cholesterol (mg/dL)   Date Value   11/16/2017 51   ----------  LDL Cholesterol (mg/dL)   Date Value   11/16/2017 49   ----------  Triglycerides (mg/dL)   Date Value   11/16/2017 115   ----------    Lab Results  Component Value Date    11/16/2017   A1C 6.0 (H) 11/16/2017       Lab Results  Component Value Date   TSH 2.650 11/16/2017     Last RF:  11/6/2017    No Follow-up on file.

## 2018-02-12 RX ORDER — APIXABAN 5 MG/1
TABLET, FILM COATED ORAL
Qty: 180 TABLET | Refills: 0 | Status: SHIPPED | OUTPATIENT
Start: 2018-02-12 | End: 2018-05-12

## 2018-02-12 NOTE — TELEPHONE ENCOUNTER
Future appt:    Last Appointment:  12/12/2017  Last annual:  11/22/17  Last refill given:   12/30- for #90,  Pt takes RX BID.      Cholesterol, Total (mg/dL)   Date Value   11/16/2017 123   ----------  HDL Cholesterol (mg/dL)   Date Value   11/16/2017 51

## 2018-02-19 RX ORDER — ESCITALOPRAM OXALATE 10 MG/1
10 TABLET ORAL
Qty: 90 TABLET | Refills: 3 | Status: SHIPPED | OUTPATIENT
Start: 2018-02-19 | End: 2019-04-13

## 2018-02-19 RX ORDER — METOPROLOL SUCCINATE 25 MG/1
25 TABLET, EXTENDED RELEASE ORAL
Qty: 90 TABLET | Refills: 3 | Status: SHIPPED | OUTPATIENT
Start: 2018-02-19 | End: 2019-04-16 | Stop reason: DRUGHIGH

## 2018-02-19 RX ORDER — OMEPRAZOLE 20 MG/1
CAPSULE, DELAYED RELEASE ORAL
Qty: 90 CAPSULE | Refills: 1 | Status: SHIPPED | OUTPATIENT
Start: 2018-02-19 | End: 2018-02-28 | Stop reason: DRUGHIGH

## 2018-02-19 RX ORDER — OXYBUTYNIN CHLORIDE 5 MG/1
5 TABLET, EXTENDED RELEASE ORAL 2 TIMES DAILY
Qty: 180 TABLET | Refills: 1 | Status: SHIPPED | OUTPATIENT
Start: 2018-02-19 | End: 2018-10-02

## 2018-02-21 RX ORDER — POTASSIUM CHLORIDE 20 MEQ/1
20 TABLET, EXTENDED RELEASE ORAL
Qty: 90 TABLET | Refills: 1 | Status: SHIPPED | OUTPATIENT
Start: 2018-02-21 | End: 2018-03-24

## 2018-02-21 NOTE — TELEPHONE ENCOUNTER
Future appt:    Last Appointment:  12/12/2017- no f/u indicated    Potassium refilled last: 9/25/17 for 6 month refill. Pharmacy also requested refills on Escitalopram, Metoprolol, Oxybutynin, Eliquis, and Omeprazole.   RX's recently approved, called [facility]

## 2018-02-28 ENCOUNTER — OFFICE VISIT (OUTPATIENT)
Dept: FAMILY MEDICINE CLINIC | Facility: CLINIC | Age: 63
End: 2018-02-28

## 2018-02-28 VITALS
SYSTOLIC BLOOD PRESSURE: 118 MMHG | HEART RATE: 60 BPM | OXYGEN SATURATION: 96 % | RESPIRATION RATE: 16 BRPM | WEIGHT: 241.5 LBS | TEMPERATURE: 98 F | DIASTOLIC BLOOD PRESSURE: 76 MMHG

## 2018-02-28 DIAGNOSIS — R10.11 RIGHT UPPER QUADRANT ABDOMINAL PAIN: Primary | ICD-10-CM

## 2018-02-28 DIAGNOSIS — Z79.01 ANTICOAGULANT LONG-TERM USE: ICD-10-CM

## 2018-02-28 DIAGNOSIS — I82.432 ACUTE DEEP VEIN THROMBOSIS (DVT) OF POPLITEAL VEIN OF LEFT LOWER EXTREMITY (HCC): ICD-10-CM

## 2018-02-28 DIAGNOSIS — Z12.11 COLON CANCER SCREENING: ICD-10-CM

## 2018-02-28 PROCEDURE — 99214 OFFICE O/P EST MOD 30 MIN: CPT | Performed by: FAMILY MEDICINE

## 2018-02-28 RX ORDER — OMEPRAZOLE 40 MG/1
40 CAPSULE, DELAYED RELEASE ORAL DAILY
Qty: 90 CAPSULE | Refills: 0 | Status: SHIPPED | OUTPATIENT
Start: 2018-02-28 | End: 2018-05-27

## 2018-02-28 NOTE — PROGRESS NOTES
[facility] [place]  PROGRESS NOTE  Chief Complaint:   Patient presents with:  Pain: Right side pain  Heartburn      HPI:   This is a 70year old female coming in for eval of abd pain,,pressent sensation RUQ.    occ episode of more severe and into Additional Information NOTE:  The Pap smear is a screening test that aids in the detection of   cervical cancer and its precursors. False negative and false positive   results can occur.  Regular sampling is recommended to minimize false   negative results x1 in R knee  Social History:  Social History    Marital status:              Spouse name:                       Years of education:                 Number of children:               Social History Main Topics    Smoking status: Never Smoker Disp: 90 tablet Rfl: 3   Oxybutynin Chloride ER 5 MG Oral Tablet 24 Hr Take 1 tablet (5 mg total) by mouth 2 (two) times daily.  Disp: 180 tablet Rfl: 1   ELIQUIS 5 MG Oral Tab TAKE 1 TABLET(5 MG) BY MOUTH TWICE DAILY Disp: 180 tablet Rfl: 0   TRIAMTERENE-H nodes  PSYCHIATRIC:  Denies depression or anxiety. ENDOCRINOLOGIC:  Denies excessive sweating, cold or heat intolerance, polyuria or polydipsia. ALLERGIES:  Denies allergic response, history of asthma, sneezing, hives, eczema or rhinitis.      EXAM:   BP of left forearm     Asymptomatic postmenopausal status     Pulmonary infarction (HCC)     Rheumatoid arthritis involving multiple sites with positive rheumatoid factor (HCC)     Injury of shoulder, right     Goiter     Benign paroxysmal positional vertigo

## 2018-02-28 NOTE — PATIENT INSTRUCTIONS
rec trial increase omeprozole to 40 mg a day    appt Dr. Yolanda Villanueva re abd pain     call if severe episode again- then labs-- chem, cbc, amylase/ lipase    Diet as tolerate

## 2018-03-04 PROCEDURE — 82272 OCCULT BLD FECES 1-3 TESTS: CPT

## 2018-03-08 ENCOUNTER — LAB ENCOUNTER (OUTPATIENT)
Dept: LAB | Age: 63
End: 2018-03-08
Attending: FAMILY MEDICINE

## 2018-03-08 DIAGNOSIS — K92.1 BLOODY STOOL: Primary | ICD-10-CM

## 2018-03-12 ENCOUNTER — TELEPHONE (OUTPATIENT)
Dept: FAMILY MEDICINE CLINIC | Facility: CLINIC | Age: 63
End: 2018-03-12

## 2018-03-12 NOTE — TELEPHONE ENCOUNTER
----- Message from Rhiannon Pedroza MD sent at 3/12/2018  7:58 AM CDT -----  Negative hemocult screening

## 2018-03-24 RX ORDER — OMEPRAZOLE 20 MG/1
CAPSULE, DELAYED RELEASE ORAL
Qty: 90 CAPSULE | Refills: 0 | Status: SHIPPED | OUTPATIENT
Start: 2018-03-24 | End: 2018-06-25 | Stop reason: ALTCHOICE

## 2018-03-24 RX ORDER — OXYBUTYNIN CHLORIDE 5 MG/1
TABLET, EXTENDED RELEASE ORAL
Qty: 180 TABLET | Refills: 0 | Status: SHIPPED | OUTPATIENT
Start: 2018-03-24 | End: 2018-06-25

## 2018-03-24 RX ORDER — POTASSIUM CHLORIDE 20 MEQ/1
TABLET, EXTENDED RELEASE ORAL
Qty: 90 TABLET | Refills: 0 | Status: SHIPPED | OUTPATIENT
Start: 2018-03-24 | End: 2019-03-18

## 2018-03-24 NOTE — TELEPHONE ENCOUNTER
Future appt:    Last Appointment:  2/28/2018-    Appears request too soon  Oxybutynin given on 2/19- for 6 months  Potassium given on 2/21- for 6 months  Omeprazole given on 2/28 for 90 day supply-  Was trial          Cholesterol, Total (mg/dL)   Date Valu

## 2018-04-18 RX ORDER — ESCITALOPRAM OXALATE 10 MG/1
TABLET ORAL
Qty: 90 TABLET | Refills: 0 | OUTPATIENT
Start: 2018-04-18

## 2018-04-18 RX ORDER — METOPROLOL SUCCINATE 25 MG/1
TABLET, EXTENDED RELEASE ORAL
Qty: 90 TABLET | Refills: 0 | OUTPATIENT
Start: 2018-04-18

## 2018-04-18 NOTE — TELEPHONE ENCOUNTER
Future appt:    Last Appointment:  2/28/2018-  Pt was given instructions to f/u with GI  Metoprolol and Escitalopram both refilled back on 2/19/18 for #90 with  3 refills.   Request too soon- declined   Cholesterol, Total (mg/dL)   Date Value   11/16/2017 1

## 2018-05-12 NOTE — TELEPHONE ENCOUNTER
Pt called back-  Update:  Pt was sent to Dr. Flor Newton per MARIA A Norton due to wound that was not healing- L great toe. Dr. 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 Yumi Vidal- cardiology  Pt had angioplasty on 4/5. And was started on Plavix. Also taking Eliquis, but only 2.5 mg BID. Then pt had follow up to Dr. Edu Sanchez as well. Pt will request refill from Dr. Jose Eduardo Mcderomtt    [facility], pt will contact [employer] to cancel request.  Pt sees Dr. Flor Newton every 2 wks, next appt is Thursday 5/17. Pt will call back to scheduled after Thursdays appt.

## 2018-05-12 NOTE — TELEPHONE ENCOUNTER
Future appt:    Last Appointment:  2/28/2018  Last refill  2/12/18-  #180-  Last px:  11/22/17  Left message for pt to call to schedule f/u appt. Cholesterol, Total (mg/dL)   Date Value   11/16/2017 123   ----------  HDL Cholesterol (mg/dL)   Date Value   11/16/2017 51   ----------  LDL Cholesterol (mg/dL)   Date Value   11/16/2017 49   ----------  Triglycerides (mg/dL)   Date Value   11/16/2017 115   ----------    Lab Results  Component Value Date    11/16/2017   A1C 6.0 (H) 11/16/2017       Lab Results  Component Value Date   TSH 2.650 11/16/2017       No Follow-up on file.

## 2018-05-14 RX ORDER — APIXABAN 5 MG/1
TABLET, FILM COATED ORAL
Qty: 180 TABLET | Refills: 3 | Status: SHIPPED | OUTPATIENT
Start: 2018-05-14 | End: 2018-08-15 | Stop reason: DRUGHIGH

## 2018-05-29 RX ORDER — OMEPRAZOLE 40 MG/1
CAPSULE, DELAYED RELEASE ORAL
Qty: 90 CAPSULE | Refills: 0 | Status: SHIPPED | OUTPATIENT
Start: 2018-05-29 | End: 2018-06-25

## 2018-05-29 NOTE — TELEPHONE ENCOUNTER
Future appt:    Last Appointment:  2/28/2018-  Last visit pt was recommended to f/u with Dr. Sarah Clayton for ab pain  40mg of Omeprazole was recommended at that time  Last annual:  11/22/17  Last refill:  3/24/18 for #90    Cholesterol, Total (mg/dL)   Date Lorena

## 2018-06-11 ENCOUNTER — TELEPHONE (OUTPATIENT)
Dept: FAMILY MEDICINE CLINIC | Facility: CLINIC | Age: 63
End: 2018-06-11

## 2018-06-11 NOTE — TELEPHONE ENCOUNTER
Dr. Jonathna Phillips office calling regarding patient  Patient with RA and gets Cimzia injections done at Dr. Jonathan Phillips office  Patient had quantiferon gold testing done for TB due to the Cimzia injections  Bloodwork came back positive per Mer Suh at Dr. Michael Andrews

## 2018-06-12 NOTE — TELEPHONE ENCOUNTER
Dr. Iza Washington office states they will be notifying the health department  Nothing further for us to do    [facility], 06/12/18, 8:44 AM

## 2018-06-18 ENCOUNTER — TELEPHONE (OUTPATIENT)
Dept: FAMILY MEDICINE CLINIC | Facility: CLINIC | Age: 63
End: 2018-06-18

## 2018-06-18 NOTE — TELEPHONE ENCOUNTER
has appt on monday for a positive TB test- xrays were done but it did no show that she was active - patient would like to speak to Gauri Dickson / nurse

## 2018-06-18 NOTE — TELEPHONE ENCOUNTER
Pt states rheumatologist has pt on Cimzia. Pt had recent labs- pt states her TB (lab) testing was positive. States she had a CXR that showed no changes. Pt states she has appt with CR on Monday, wanted to make sure we had labs and CXR report.   Pt inform

## 2018-06-25 ENCOUNTER — OFFICE VISIT (OUTPATIENT)
Dept: FAMILY MEDICINE CLINIC | Facility: CLINIC | Age: 63
End: 2018-06-25

## 2018-06-25 VITALS
SYSTOLIC BLOOD PRESSURE: 118 MMHG | OXYGEN SATURATION: 93 % | HEART RATE: 66 BPM | TEMPERATURE: 97 F | RESPIRATION RATE: 16 BRPM | DIASTOLIC BLOOD PRESSURE: 72 MMHG

## 2018-06-25 DIAGNOSIS — M05.79 RHEUMATOID ARTHRITIS INVOLVING MULTIPLE SITES WITH POSITIVE RHEUMATOID FACTOR (HCC): ICD-10-CM

## 2018-06-25 DIAGNOSIS — R76.12 REACTION TO QUANTIFERON-TB TEST: Primary | ICD-10-CM

## 2018-06-25 PROCEDURE — 99213 OFFICE O/P EST LOW 20 MIN: CPT | Performed by: FAMILY MEDICINE

## 2018-06-25 RX ORDER — PANTOPRAZOLE SODIUM 40 MG/1
40 TABLET, DELAYED RELEASE ORAL
COMMUNITY
End: 2018-12-14

## 2018-06-25 RX ORDER — CLOPIDOGREL BISULFATE 75 MG/1
75 TABLET ORAL DAILY
COMMUNITY
End: 2018-08-15

## 2018-06-25 NOTE — PROGRESS NOTES
The Online TST/QFT  Results  www. [email or web address] [email or web address] Kvaleberg  (Version 2.0 March 10, 2011)  Review & Analysis:  Christian Delatorre; hCarito Kumari MD, MSc; Meredith Anthony MD, PhD  Design & Programming:  Christian Delatorre  Print  Below are the results for hanna monreal

## 2018-06-25 NOTE — PROGRESS NOTES
[address]  PROGRESS NOTE  Chief Complaint:   Patient presents with:   Follow - Up: Positive TB test, had chest x-ray done at [facility]      HPI:   This is a 70year old female coming in for eval   Pt treated with \"biologics for Rhemeutoid art Never Smoker                                                                Smokeless tobacco: Never Used                        Alcohol use: No              Drug use:  No              Family History:  Family History   Problem Relation Age of Onset   • Canc by mouth 2 (two) times daily. Disp: 180 tablet Rfl: 1   TRIAMTERENE-HCTZ 37.5-25 MG Oral Tab TAKE 1 TABLET BY MOUTH EVERY DAY Disp: 90 tablet Rfl: 3   predniSONE 5 MG Oral Tab Take 5 mg by mouth daily.  Disp:  Rfl:    Fluticasone Propionate 50 MCG/ACT Nasal Sitting, Cuff Size: large)   Pulse 66   Temp 97.3 °F (36.3 °C) (Temporal)   Resp 16   SpO2 93%  There is no height or weight on file to calculate BMI. Vital signs reviewed. Appears stated age, well groomed.   Physical Exam:  GEN:  Patient is alert, awake a [facility])     Cataract     Left leg swelling     Chronic seasonal allergic rhinitis     Pressure ulcer of toe of left foot, stage 3 (HCC)     Cellulitis of toe of left foot      Patient Instructions   Pt to be referred to Health DEPT for POS Quantiferon with n

## 2018-08-08 ENCOUNTER — TELEPHONE (OUTPATIENT)
Dept: FAMILY MEDICINE CLINIC | Facility: CLINIC | Age: 63
End: 2018-08-08

## 2018-08-08 NOTE — TELEPHONE ENCOUNTER
Calling about referral for Dr Brian Anthony. Joyce Bello will call you on Friday.     She said you have been going back and forth this week about the referral.

## 2018-08-10 ENCOUNTER — TELEPHONE (OUTPATIENT)
Dept: FAMILY MEDICINE CLINIC | Facility: CLINIC | Age: 63
End: 2018-08-10

## 2018-08-14 NOTE — TELEPHONE ENCOUNTER
Spoke w/ Ray Toribio at [address] [facility]. DIrections per [facility] state that treatment is being directed to PCP per rheumatology re: positive TB test.  Left message for DR. Flores's office.

## 2018-08-15 ENCOUNTER — OFFICE VISIT (OUTPATIENT)
Dept: FAMILY MEDICINE CLINIC | Facility: CLINIC | Age: 63
End: 2018-08-15
Payer: MEDICARE

## 2018-08-15 VITALS
TEMPERATURE: 98 F | OXYGEN SATURATION: 95 % | HEART RATE: 64 BPM | SYSTOLIC BLOOD PRESSURE: 124 MMHG | RESPIRATION RATE: 16 BRPM | DIASTOLIC BLOOD PRESSURE: 66 MMHG

## 2018-08-15 DIAGNOSIS — I26.99 PULMONARY INFARCTION (HCC): ICD-10-CM

## 2018-08-15 DIAGNOSIS — M05.79 RHEUMATOID ARTHRITIS INVOLVING MULTIPLE SITES WITH POSITIVE RHEUMATOID FACTOR (HCC): ICD-10-CM

## 2018-08-15 DIAGNOSIS — R73.09 ABNORMAL GLUCOSE: ICD-10-CM

## 2018-08-15 DIAGNOSIS — I10 BENIGN ESSENTIAL HYPERTENSION: ICD-10-CM

## 2018-08-15 DIAGNOSIS — Z22.7 TB LUNG, LATENT: Primary | ICD-10-CM

## 2018-08-15 PROCEDURE — 99215 OFFICE O/P EST HI 40 MIN: CPT | Performed by: FAMILY MEDICINE

## 2018-08-15 RX ORDER — TRIAMTERENE AND HYDROCHLOROTHIAZIDE 37.5; 25 MG/1; MG/1
1 CAPSULE ORAL
Refills: 2 | COMMUNITY
Start: 2018-07-10 | End: 2019-04-13

## 2018-08-15 RX ORDER — APIXABAN 2.5 MG/1
2.5 TABLET, FILM COATED ORAL 2 TIMES DAILY
Refills: 3 | COMMUNITY
Start: 2018-07-29

## 2018-08-15 RX ORDER — PREDNISONE 2.5 MG
2.5 TABLET ORAL EVERY OTHER DAY
COMMUNITY
End: 2020-04-22 | Stop reason: DRUGHIGH

## 2018-08-15 NOTE — TELEPHONE ENCOUNTER
[facility] nurse states that latent TB is not reportable, active TB is. States possible tx plan deferred back to CR.

## 2018-08-15 NOTE — PROGRESS NOTES
[facility] [place]  PROGRESS NOTE  Chief Complaint:   Patient presents with:  Tuberculosis      HPI:   This is a 70year old female coming in for discussion of her diagnosis of latent TB.   Patient with a positive QuantiFERON testing she has a ne [facility]     in wheel chair   • Shoulder fracture, right 2010   • Sjogren's syndrome ([facility]    • Stress fracture of foot      Past Surgical History:  No date: OTHER      Comment: Tendon repair to R hand  No date: TOTAL HIP REPLACEMENT      Comment: x 2.5 mg by mouth every other day.  Disp:  Rfl:    Pantoprazole Sodium 40 MG Oral Tab EC Take 40 mg by mouth every morning before breakfast. Disp:  Rfl:    POTASSIUM CHLORIDE ER 20 MEQ Oral Tab CR TAKE 1 TABLET BY MOUTH EVERY DAY Disp: 90 tablet Rfl: 0   esci tingling in the extremities  HEMATOLOGIC:  Denies anemia, bleeding or bruising. LYMPHATICS:  Denies enlarged nodes  PSYCHIATRIC:  Denies depression or anxiety. ENDOCRINOLOGIC:  Denies excessive sweating, cold or heat intolerance, polyuria or polydipsia. COMP METABOLIC PANEL (14); Future  - LIPID PANEL; Future  - MAGNESIUM; Future  - MICROALB/CREAT RATIO, RANDOM URINE; Future  - TSH W REFLEX TO FREE T4; Future  - URINALYSIS WITH CULTURE REFLEX; Future    4.  Rheumatoid arthritis involving multiple sites wit Maintenance:        Janay Vera MD  8/15/2018  5:14 PM    Patient/Caregiver Education: Patient/Caregiver Education: There are no barriers to learning. Medical education done. Outcome: Patient verbalizes understanding.  Patient is notified to call w

## 2018-08-15 NOTE — TELEPHONE ENCOUNTER
[facility] is deferring pt back to PCP for management of positive Quantiferon Gold that was drawn on 6/7/18. Drawn initially per Dr. John Root  due to protocol while pt was taking Jacque for psoriatic arthritis.  Currently pt is off of Jacque  Pt seen per  CR

## 2018-08-15 NOTE — TELEPHONE ENCOUNTER
Appt given.     Future Appointments  Date Time Provider Syd Koch   8/15/2018 5:30 PM Nkechi Chris MD [facility] [place] [facility]

## 2018-08-15 NOTE — TELEPHONE ENCOUNTER
Recommendations revcieved from health dept and reviewed with DR. Gabriel Madrigal, medical director. Please schedule pt to come in to discuss with me treatment.

## 2018-08-15 NOTE — PATIENT INSTRUCTIONS
rec fasting labs in few days    Ct scan of chest - with contrast --pending reviewed lab results    Infectious disease consult  D/w Dr. Vu Granado- He will see her next Wednesday 8/22/18 at [facility]- please call patient scheduling to get a time and pl

## 2018-08-16 ENCOUNTER — LABORATORY ENCOUNTER (OUTPATIENT)
Dept: LAB | Age: 63
End: 2018-08-16
Attending: FAMILY MEDICINE
Payer: MEDICARE

## 2018-08-16 ENCOUNTER — TELEPHONE (OUTPATIENT)
Dept: FAMILY MEDICINE CLINIC | Facility: CLINIC | Age: 63
End: 2018-08-16

## 2018-08-16 DIAGNOSIS — I26.99 PULMONARY INFARCTION (HCC): ICD-10-CM

## 2018-08-16 DIAGNOSIS — R05.9 COUGH: Primary | ICD-10-CM

## 2018-08-16 DIAGNOSIS — Z22.7 TB LUNG, LATENT: ICD-10-CM

## 2018-08-16 DIAGNOSIS — M05.79 RHEUMATOID ARTHRITIS INVOLVING MULTIPLE SITES WITH POSITIVE RHEUMATOID FACTOR (HCC): ICD-10-CM

## 2018-08-16 DIAGNOSIS — I10 BENIGN ESSENTIAL HYPERTENSION: ICD-10-CM

## 2018-08-16 DIAGNOSIS — R73.09 ABNORMAL GLUCOSE: ICD-10-CM

## 2018-08-16 LAB
ALBUMIN SERPL-MCNC: 3.6 G/DL (ref 3.5–4.8)
ALBUMIN/GLOB SERPL: 1 {RATIO} (ref 1–2)
ALP LIVER SERPL-CCNC: 107 U/L (ref 50–130)
ALT SERPL-CCNC: 35 U/L (ref 14–54)
ANION GAP SERPL CALC-SCNC: 6 MMOL/L (ref 0–18)
AST SERPL-CCNC: 25 U/L (ref 15–41)
BASOPHILS # BLD AUTO: 0.02 X10(3) UL (ref 0–0.1)
BASOPHILS NFR BLD AUTO: 0.3 %
BILIRUB SERPL-MCNC: 0.3 MG/DL (ref 0.1–2)
BILIRUB UR QL STRIP.AUTO: NEGATIVE
BUN BLD-MCNC: 22 MG/DL (ref 8–20)
BUN/CREAT SERPL: 23.9 (ref 10–20)
CALCIUM BLD-MCNC: 8.7 MG/DL (ref 8.3–10.3)
CHLORIDE SERPL-SCNC: 108 MMOL/L (ref 101–111)
CHOLEST SMN-MCNC: 112 MG/DL (ref ?–200)
CLARITY UR REFRACT.AUTO: CLEAR
CO2 SERPL-SCNC: 27 MMOL/L (ref 22–32)
COLOR UR AUTO: YELLOW
CREAT BLD-MCNC: 0.92 MG/DL (ref 0.55–1.02)
CREAT UR-SCNC: 91.2 MG/DL
EOSINOPHIL # BLD AUTO: 0.19 X10(3) UL (ref 0–0.3)
EOSINOPHIL NFR BLD AUTO: 3.2 %
ERYTHROCYTE [DISTWIDTH] IN BLOOD BY AUTOMATED COUNT: 15.8 % (ref 11.5–16)
EST. AVERAGE GLUCOSE BLD GHB EST-MCNC: 123 MG/DL (ref 68–126)
GLOBULIN PLAS-MCNC: 3.5 G/DL (ref 2.5–4)
GLUCOSE BLD-MCNC: 92 MG/DL (ref 70–99)
GLUCOSE UR STRIP.AUTO-MCNC: NEGATIVE MG/DL
HAV IGM SER QL: 1.7 MG/DL (ref 1.8–2.5)
HBA1C MFR BLD HPLC: 5.9 % (ref ?–5.7)
HCT VFR BLD AUTO: 39 % (ref 34–50)
HDLC SERPL-MCNC: 38 MG/DL (ref 40–59)
HGB BLD-MCNC: 12.1 G/DL (ref 12–16)
IMMATURE GRANULOCYTE COUNT: 0.01 X10(3) UL (ref 0–1)
IMMATURE GRANULOCYTE RATIO %: 0.2 %
KETONES UR STRIP.AUTO-MCNC: NEGATIVE MG/DL
LDLC SERPL CALC-MCNC: 54 MG/DL (ref ?–100)
LEUKOCYTE ESTERASE UR QL STRIP.AUTO: NEGATIVE
LYMPHOCYTES # BLD AUTO: 1.67 X10(3) UL (ref 0.9–4)
LYMPHOCYTES NFR BLD AUTO: 27.8 %
M PROTEIN MFR SERPL ELPH: 7.1 G/DL (ref 6.1–8.3)
MCH RBC QN AUTO: 25.6 PG (ref 27–33.2)
MCHC RBC AUTO-ENTMCNC: 31 G/DL (ref 31–37)
MCV RBC AUTO: 82.6 FL (ref 81–100)
MICROALBUMIN UR-MCNC: 0.74 MG/DL
MICROALBUMIN/CREAT 24H UR-RTO: 8.1 UG/MG (ref ?–30)
MONOCYTES # BLD AUTO: 0.56 X10(3) UL (ref 0.1–1)
MONOCYTES NFR BLD AUTO: 9.3 %
NEUTROPHIL ABS PRELIM: 3.55 X10 (3) UL (ref 1.3–6.7)
NEUTROPHILS # BLD AUTO: 3.55 X10(3) UL (ref 1.3–6.7)
NEUTROPHILS NFR BLD AUTO: 59.2 %
NITRITE UR QL STRIP.AUTO: NEGATIVE
NONHDLC SERPL-MCNC: 74 MG/DL (ref ?–130)
OSMOLALITY SERPL CALC.SUM OF ELEC: 295 MOSM/KG (ref 275–295)
PH UR STRIP.AUTO: 5 [PH] (ref 4.5–8)
PLATELET # BLD AUTO: 306 10(3)UL (ref 150–450)
POTASSIUM SERPL-SCNC: 3.8 MMOL/L (ref 3.6–5.1)
PROT UR STRIP.AUTO-MCNC: NEGATIVE MG/DL
RBC # BLD AUTO: 4.72 X10(6)UL (ref 3.8–5.1)
RBC UR QL AUTO: NEGATIVE
RED CELL DISTRIBUTION WIDTH-SD: 46.9 FL (ref 35.1–46.3)
SODIUM SERPL-SCNC: 141 MMOL/L (ref 136–144)
SP GR UR STRIP.AUTO: 1.02 (ref 1–1.03)
TRIGL SERPL-MCNC: 99 MG/DL (ref 30–149)
TSI SER-ACNC: 3.07 MIU/ML (ref 0.35–5.5)
UROBILINOGEN UR STRIP.AUTO-MCNC: <2 MG/DL
VLDLC SERPL CALC-MCNC: 20 MG/DL (ref 0–30)
WBC # BLD AUTO: 6 X10(3) UL (ref 4–13)

## 2018-08-16 PROCEDURE — 81003 URINALYSIS AUTO W/O SCOPE: CPT

## 2018-08-16 PROCEDURE — 80061 LIPID PANEL: CPT

## 2018-08-16 PROCEDURE — 83735 ASSAY OF MAGNESIUM: CPT

## 2018-08-16 PROCEDURE — 82570 ASSAY OF URINE CREATININE: CPT

## 2018-08-16 PROCEDURE — 83036 HEMOGLOBIN GLYCOSYLATED A1C: CPT

## 2018-08-16 PROCEDURE — 80053 COMPREHEN METABOLIC PANEL: CPT

## 2018-08-16 PROCEDURE — 85025 COMPLETE CBC W/AUTO DIFF WBC: CPT

## 2018-08-16 PROCEDURE — 84443 ASSAY THYROID STIM HORMONE: CPT

## 2018-08-16 PROCEDURE — 36415 COLL VENOUS BLD VENIPUNCTURE: CPT

## 2018-08-16 PROCEDURE — 82043 UR ALBUMIN QUANTITATIVE: CPT

## 2018-08-16 NOTE — TELEPHONE ENCOUNTER
Spoke w/ Penny Zavala at Dr. Varun Casper office. Appt scheduled for pt on 8/22 at 1:15pm.  Pt was informed. Referral w/ recent office notes per JOSHUA and records per Dr. John Malin faxed to #[phone].

## 2018-08-17 ENCOUNTER — TELEPHONE (OUTPATIENT)
Dept: FAMILY MEDICINE CLINIC | Facility: CLINIC | Age: 63
End: 2018-08-17

## 2018-08-17 NOTE — TELEPHONE ENCOUNTER
----- Message from Merlinda Deacon, MD sent at 8/16/2018  9:14 PM CDT -----  Labs reviewed  hgb1c- 5.9 and reassuring  Negative urine  Chem panel- normal renal function, normal electrolytes, normal liver function  Lipids- normal  Thyroid -normal  Cbc- no

## 2018-08-22 ENCOUNTER — TELEPHONE (OUTPATIENT)
Dept: FAMILY MEDICINE CLINIC | Facility: CLINIC | Age: 63
End: 2018-08-22

## 2018-08-24 RX ORDER — POTASSIUM CHLORIDE 20 MEQ/1
20 TABLET, EXTENDED RELEASE ORAL
Qty: 90 TABLET | Refills: 1 | Status: SHIPPED | OUTPATIENT
Start: 2018-08-24 | End: 2019-02-23

## 2018-08-24 NOTE — TELEPHONE ENCOUNTER
Future appt:    Last Appointment:  8/15/2018-  Klor-Con refilled 3/24/18 for #90    Cholesterol, Total (mg/dL)   Date Value   08/16/2018 112   ----------  HDL Cholesterol (mg/dL)   Date Value   08/16/2018 38 (L)   ----------  LDL Cholesterol (mg/dL)   Date

## 2018-10-02 RX ORDER — OXYBUTYNIN CHLORIDE 5 MG/1
TABLET, EXTENDED RELEASE ORAL
Qty: 180 TABLET | Refills: 1 | Status: SHIPPED | OUTPATIENT
Start: 2018-10-02 | End: 2019-03-20

## 2018-10-02 NOTE — TELEPHONE ENCOUNTER
Future appt:    Last Appointment:  8/15/2018    Oxybutynin refilled on 2/19 for #180 with one refill     Cholesterol, Total (mg/dL)   Date Value   08/16/2018 112     HDL Cholesterol (mg/dL)   Date Value   08/16/2018 38 (L)     LDL Cholesterol (mg/dL)   Date Value   08/16/2018 54     Triglycerides (mg/dL)   Date Value   08/16/2018 99     Lab Results   Component Value Date     08/16/2018    A1C 5.9 (H) 08/16/2018     Lab Results   Component Value Date    TSH 3.070 08/16/2018       No Follow-up on file.

## 2018-11-16 NOTE — TELEPHONE ENCOUNTER
Future appt:    Last Appointment:  12/12/2017    Cholesterol, Total (mg/dL)   Date Value   11/16/2017 123   ----------  HDL Cholesterol (mg/dL)   Date Value   11/16/2017 51   ----------  LDL Cholesterol (mg/dL)   Date Value   11/16/2017 49   ----------  Tr
Diabetes    Hypertension    Necrotizing fasciitis

## 2018-11-19 ENCOUNTER — TELEPHONE (OUTPATIENT)
Dept: FAMILY MEDICINE CLINIC | Facility: CLINIC | Age: 63
End: 2018-11-19

## 2018-11-19 NOTE — TELEPHONE ENCOUNTER
Received a faxed refill request for Klor-Con M20. Last Rx: 8/24/18 - should still have refills. I spoke with pharmacy and they states that pt still has refills on file. Future appt:  None  Last Appointment:  8/15/2018 no f/u noted.     Cholesterol, Tota

## 2018-12-14 ENCOUNTER — OFFICE VISIT (OUTPATIENT)
Dept: FAMILY MEDICINE CLINIC | Facility: CLINIC | Age: 63
End: 2018-12-14
Payer: MEDICARE

## 2018-12-14 VITALS
SYSTOLIC BLOOD PRESSURE: 132 MMHG | RESPIRATION RATE: 20 BRPM | WEIGHT: 236 LBS | TEMPERATURE: 98 F | DIASTOLIC BLOOD PRESSURE: 68 MMHG | HEART RATE: 64 BPM

## 2018-12-14 DIAGNOSIS — R00.1 SINUS BRADYCARDIA: ICD-10-CM

## 2018-12-14 DIAGNOSIS — Z00.00 ENCOUNTER FOR INITIAL PREVENTIVE PHYSICAL EXAMINATION COVERED BY MEDICARE: Primary | ICD-10-CM

## 2018-12-14 DIAGNOSIS — I10 BENIGN ESSENTIAL HYPERTENSION: ICD-10-CM

## 2018-12-14 DIAGNOSIS — Z23 NEED FOR PNEUMOCOCCAL VACCINATION: ICD-10-CM

## 2018-12-14 DIAGNOSIS — Z22.7 TB LUNG, LATENT: ICD-10-CM

## 2018-12-14 DIAGNOSIS — K76.0 FATTY LIVER: ICD-10-CM

## 2018-12-14 DIAGNOSIS — M05.79 RHEUMATOID ARTHRITIS INVOLVING MULTIPLE SITES WITH POSITIVE RHEUMATOID FACTOR (HCC): ICD-10-CM

## 2018-12-14 DIAGNOSIS — E87.6 HYPOPOTASSEMIA: ICD-10-CM

## 2018-12-14 PROBLEM — L03.032 CELLULITIS OF TOE OF LEFT FOOT: Status: RESOLVED | Noted: 2017-12-12 | Resolved: 2018-12-14

## 2018-12-14 PROCEDURE — G0403 EKG FOR INITIAL PREVENT EXAM: HCPCS | Performed by: FAMILY MEDICINE

## 2018-12-14 PROCEDURE — 90732 PPSV23 VACC 2 YRS+ SUBQ/IM: CPT | Performed by: FAMILY MEDICINE

## 2018-12-14 PROCEDURE — G0009 ADMIN PNEUMOCOCCAL VACCINE: HCPCS | Performed by: FAMILY MEDICINE

## 2018-12-14 PROCEDURE — G0402 INITIAL PREVENTIVE EXAM: HCPCS | Performed by: FAMILY MEDICINE

## 2018-12-14 RX ORDER — MAGNESIUM OXIDE 400 MG (241.3 MG MAGNESIUM) TABLET
400 TABLET DAILY
COMMUNITY
End: 2021-07-13 | Stop reason: DRUGHIGH

## 2018-12-14 RX ORDER — DIPHENHYDRAMINE HYDROCHLORIDE 25 MG/1
25 CAPSULE ORAL
COMMUNITY
End: 2020-01-24

## 2018-12-14 RX ORDER — ISONIAZID 300 MG/1
300 TABLET ORAL
Refills: 2 | COMMUNITY
Start: 2018-10-19 | End: 2019-06-01 | Stop reason: ALTCHOICE

## 2018-12-14 RX ORDER — OMEPRAZOLE 40 MG/1
40 CAPSULE, DELAYED RELEASE ORAL DAILY
Qty: 90 CAPSULE | Refills: 3 | Status: SHIPPED | OUTPATIENT
Start: 2018-12-14 | End: 2019-12-03

## 2018-12-14 NOTE — PROGRESS NOTES
CC: Annual Physical Exam    HPI:   Sanjay Yoder is a 70year old female who presents for a complete physical exam.      Pt new to medicare  Pt with latent TB- Dr. Paola Craft- on treatment since Sept.  Pt on omeprazole-- was on 40 mg-- had to stop while plavix LDL Cholesterol 54 <100 mg/dL    VLDL 20 0 - 30 mg/dL    Non HDL Chol 74 <130 mg/dL   MAGNESIUM   Result Value Ref Range    Magnesium 1.7 (L) 1.8 - 2.5 mg/dL   MICROALB/CREAT RATIO, RANDOM URINE   Result Value Ref Range    Microalbumin, Urine 0.74 mg/dL mouth. 2250 mg BID-  podiatry Disp:  Rfl:    magnesium oxide 400 MG Oral Tab Take 400 mg by mouth daily. Disp:  Rfl:    Pyridoxine HCl (VITAMIN B-6) 25 MG Oral Tab Take 25 mg by mouth.  4 times per week Disp:  Rfl:    Certolizumab Pegol (CIMZIA PREFILLED SC Comment:Other reaction(s): Other (See Comments)             Vision changes  Other                       Comment:Other reaction(s):  Other (See Comments)             Butazolidin   Past Medical History:   Diagnosis Date   • Anxiety    • Arthritis    • fatigue. EENT:  Eyes:  Denies eye pain, visual loss, blurred vision, double vision or yellow sclerae. Ears, Nose, Throat:  Denies hearing loss, sneezing, congestion, runny nose or sore throat.   INTEGUMENTARY:  Denies rashes, itching, skin lesion, or exces rashes, no skin lesion, no bruising, good turgor. HEART:  Regular rate and rhythm, no murmurs, rubs or gallops. LUNGS: Clear to auscultation bilterally, no rales/rhonchi/wheezing.   BREAST: No skin changes, no palpable abnormality bilaterally  CHEST: No t physical examination covered by medicare  (primary encounter diagnosis)  Benign essential hypertension  Rheumatoid arthritis involving multiple sites with positive rheumatoid factor (hcc)  Need for pneumococcal vaccination  Tb lung, latent  Hypopotassemia

## 2018-12-14 NOTE — PATIENT INSTRUCTIONS
Fasting labs in FEB-- Lipid, chem, hgba1c- planning to do at [facility] with labs frm Infectious disease    dexa scan  Advised      colopscopy - plan when able with DR. Omar Polanco today    Palmer Venegas for omeprazole Refill    decrease metoprolol to 12.5 mg -- 1/

## 2018-12-19 ENCOUNTER — TELEPHONE (OUTPATIENT)
Dept: FAMILY MEDICINE CLINIC | Facility: CLINIC | Age: 63
End: 2018-12-19

## 2018-12-19 DIAGNOSIS — M85.832 OSTEOPENIA OF LEFT FOREARM: Primary | ICD-10-CM

## 2018-12-19 NOTE — TELEPHONE ENCOUNTER
Your appointments     Date & Time Appointment Department [facility])    Jan 18, 2019  2:00 PM CST Follow up with Candice Waddell MD [address], [facility] ([facility])        25 [facility] Man[place], Marguerite Linn

## 2019-02-23 NOTE — TELEPHONE ENCOUNTER
Future appt:     Your appointments     Date & Time Appointment Department [facility])    Mar 18, 2019  3:30 PM CDT Follow up with Nkechi Chris MD [address] [facility] Jonathan Dickens ([facility])        Erika [address], S

## 2019-02-24 RX ORDER — POTASSIUM CHLORIDE 20 MEQ/1
20 TABLET, EXTENDED RELEASE ORAL
Qty: 90 TABLET | Refills: 1 | Status: SHIPPED | OUTPATIENT
Start: 2019-02-24 | End: 2019-08-26

## 2019-03-05 ENCOUNTER — TELEPHONE (OUTPATIENT)
Dept: FAMILY MEDICINE CLINIC | Facility: CLINIC | Age: 64
End: 2019-03-05

## 2019-03-05 NOTE — TELEPHONE ENCOUNTER
Lab orders faxed to [facility]  Lab orders entered on 12/14/18 for Mag, Lipid, CMP, and CBC- expected 3/14/19. Pt stated that she would call [facility] to make lab appt.     Future Appointments   Date Time Provider Syd Koch   3/18/2019  3:30 PM Regine Stewart

## 2019-03-05 NOTE — TELEPHONE ENCOUNTER
RE:   RADHA  done at [facility]  -  please fax orders to pt scheduling   no call back necessary

## 2019-03-12 LAB
ALBUMIN: 3.7 G/DL
ALKALINE PHOSPHATASE: 112
ALT: 32
AST: 29
BILIRUBIN, TOTAL: 0.3 MG/DL
BUN/CREATININE RATIO: 26
BUN: 20
CALCIUM: 8.7
CHLORIDE: 104
CHOL/HDL RATIO: 2.5
CREATININE: 0.77 MG/DL
GFR NON-AFRICAN AMERICAN: 80
GLOBULIN: 3.6
GLUCOSE BLOOD: 92
HDL CHOLESTEROL: 51 MG/DL
HEMATOCRIT: 41.4 %
HEMOGLOBIN: 12.5 G/DL (ref 12–15)
LDL CHOLESTEROL: 52 MG/DL (ref ?–130)
MAGNESIUM: 1.5
MCH: 25.3 PG
MCHC: 30.1 G/DL
MCV: 84.1 FL
MPV: 7.3
NON-HDL CHOLESTEROL: 76
PLATELETS: 264 X10E3/UL
POTASSIUM: 3.7
RBC: 4.9 /HPF
RDW: 15.5 %
SODIUM: 141
TOTAL CHOLESTEROL: 127 MG/DL (ref ?–200)
TOTAL PROTEIN: 7.3
TRIGLYCERIDES: 119 MG/DL
VLDL: 24
WBC: 5.6 /HPF

## 2019-03-18 ENCOUNTER — OFFICE VISIT (OUTPATIENT)
Dept: FAMILY MEDICINE CLINIC | Facility: CLINIC | Age: 64
End: 2019-03-18
Payer: MEDICARE

## 2019-03-18 VITALS
RESPIRATION RATE: 16 BRPM | WEIGHT: 239 LBS | DIASTOLIC BLOOD PRESSURE: 74 MMHG | HEART RATE: 56 BPM | SYSTOLIC BLOOD PRESSURE: 128 MMHG | TEMPERATURE: 98 F

## 2019-03-18 DIAGNOSIS — E83.42 HYPOMAGNESEMIA: ICD-10-CM

## 2019-03-18 DIAGNOSIS — I10 BENIGN ESSENTIAL HYPERTENSION: Primary | ICD-10-CM

## 2019-03-18 DIAGNOSIS — R00.1 SINUS BRADYCARDIA: ICD-10-CM

## 2019-03-18 PROCEDURE — 99214 OFFICE O/P EST MOD 30 MIN: CPT | Performed by: FAMILY MEDICINE

## 2019-03-18 NOTE — PATIENT INSTRUCTIONS
rec STOP metoprolol    Monitor BP daily and report in 7-10 days    Recheck Magnesium level with f.u appt in a month.

## 2019-03-18 NOTE — PROGRESS NOTES
[facility]  [facility]  Chief Complaint:   Patient presents with:  Medication Follow-Up      HPI:   This is a 75year old female coming in for medical f.u  .  Appt in Jan was mixed up- pt didn't com and then with winter weather pt wait • Shoulder fracture, right 2010   • Sjogren's syndrome ([facility]    • Stress fracture of foot      Past Surgical History:   Procedure Laterality Date   • OTHER      Tendon repair to R hand   • TOTAL HIP REPLACEMENT      x4 in L hip   • TOTAL KNEE REPLACEMENT mouth. 2250 mg BID-  podiatry Disp:  Rfl:    magnesium oxide 400 MG Oral Tab Take 400 mg by mouth daily. Disp:  Rfl:    Pyridoxine HCl (VITAMIN B-6) 25 MG Oral Tab Take 25 mg by mouth.  4 times per week Disp:  Rfl:    Certolizumab Pegol (CIMZIA PREFILLED SC dryness. CARDIOVASCULAR:  Denies chest pain, chest pressure, chest discomfort, palpitations, edema, dyspnea on exertion or at rest.  RESPIRATORY:  Denies shortness of breath, wheezing, cough or sputum.   GASTROINTESTINAL:  Denies abdominal pain, nausea, vo trial of b-blocker. If low HR persisit, then consider cardiac evaluation. Pt presently asymptomatic    3. Hypomagnesemia  Persisiting, increase suppliment to 2 x a day for a week, then continue daily suppliment  Recheck level at next visit.       Problem Vineet Martinez

## 2019-03-20 RX ORDER — OXYBUTYNIN CHLORIDE 5 MG/1
TABLET, EXTENDED RELEASE ORAL
Qty: 180 TABLET | Refills: 1 | Status: SHIPPED | OUTPATIENT
Start: 2019-03-20 | End: 2019-09-21

## 2019-03-20 NOTE — TELEPHONE ENCOUNTER
RF request from [facility], [employer] for Oxybutynin 5 MG #180. Please advise. Future appt:     Your appointments     Date & Time Appointment Department [facility])    Apr 16, 2019  3:45 PM CDT Follow up with Lalo Hancock MD [address] [place] [address]

## 2019-03-26 ENCOUNTER — TELEPHONE (OUTPATIENT)
Dept: FAMILY MEDICINE CLINIC | Facility: CLINIC | Age: 64
End: 2019-03-26

## 2019-03-26 NOTE — TELEPHONE ENCOUNTER
Pt called in to report bp/pulse readings. 3/19 630p 166/70  P70  3/20 330p 148/78 P60  3/21 1p  176/86 P66  3/22 noon 167/90 P88  3/23 11a 190/80 P77  3/24 noon 159/93 P91    Please advise.

## 2019-03-26 NOTE — TELEPHONE ENCOUNTER
Reviewed- elevated BP  Pt to increase metoprolol to 25 mg a day and report BP and pulse in 7-10 days

## 2019-04-02 ENCOUNTER — TELEPHONE (OUTPATIENT)
Dept: FAMILY MEDICINE CLINIC | Facility: CLINIC | Age: 64
End: 2019-04-02

## 2019-04-02 NOTE — TELEPHONE ENCOUNTER
Pt called with BP reading - is back on Metoprolol 25 mg daily, pt had stopped for one week prior as per MD.   Pt was seen on 3/18-  BP log:   3/27: 4pm:  133/72, HR: 56  3/28: 4pm:  153/75, HR: 62  3/29: 8am: 182/90, HR: 60  3/30: 7pm: 149/69, HR: 65,  3/3

## 2019-04-13 RX ORDER — TRIAMTERENE AND HYDROCHLOROTHIAZIDE 37.5; 25 MG/1; MG/1
CAPSULE ORAL
Qty: 90 CAPSULE | Refills: 2 | Status: SHIPPED | OUTPATIENT
Start: 2019-04-13 | End: 2020-01-06

## 2019-04-13 NOTE — TELEPHONE ENCOUNTER
RF request from [facility], [employer] for Triamterene/HCTZ 37.5/25 MG #90. Please advise. Future appt:     Your appointments     Date & Time Appointment Department [facility])    Apr 16, 2019  3:45 PM CDT Follow up with Joann Madison MD [facility],

## 2019-04-15 RX ORDER — ESCITALOPRAM OXALATE 10 MG/1
TABLET ORAL
Qty: 90 TABLET | Refills: 1 | Status: SHIPPED | OUTPATIENT
Start: 2019-04-15 | End: 2019-10-04

## 2019-04-15 NOTE — TELEPHONE ENCOUNTER
Future appt:     Your appointments     Date & Time Appointment Department [facility])    Apr 16, 2019  3:45 PM CDT Follow up with Lalo Hancock MD [address], Ovid Councilman ([facility])            [address]

## 2019-04-16 ENCOUNTER — OFFICE VISIT (OUTPATIENT)
Dept: FAMILY MEDICINE CLINIC | Facility: CLINIC | Age: 64
End: 2019-04-16
Payer: MEDICARE

## 2019-04-16 VITALS
RESPIRATION RATE: 16 BRPM | SYSTOLIC BLOOD PRESSURE: 124 MMHG | DIASTOLIC BLOOD PRESSURE: 78 MMHG | HEART RATE: 60 BPM | OXYGEN SATURATION: 94 % | TEMPERATURE: 98 F

## 2019-04-16 DIAGNOSIS — I10 BENIGN ESSENTIAL HYPERTENSION: Primary | ICD-10-CM

## 2019-04-16 PROCEDURE — 99213 OFFICE O/P EST LOW 20 MIN: CPT | Performed by: FAMILY MEDICINE

## 2019-04-16 NOTE — PATIENT INSTRUCTIONS
Zyrtec  Or  clartin daily for 2 week   continue metoprolol    Monitor BP pulse- report readings in 4-6 weeks    Encourage hydration

## 2019-04-16 NOTE — PROGRESS NOTES
[address]  PROGRESS NOTE  Chief Complaint:   Patient presents with: Follow - Up: 1 month f/u      HPI:   This is a 75year old female here for follow-up of her blood pressure and pulse.   Patient had tried weaning off of metoprolol due x4 in L hip   • TOTAL KNEE REPLACEMENT      x3 in L knee and x1 in R knee     Social History:  Social History    Tobacco Use      Smoking status: Never Smoker      Smokeless tobacco: Never Used    Alcohol use: No      Alcohol/week: 0.0 oz    Drug use:  No oxide 400 MG Oral Tab Take 400 mg by mouth daily. Disp:  Rfl:    Pyridoxine HCl (VITAMIN B-6) 25 MG Oral Tab Take 25 mg by mouth. 4 times per week Disp:  Rfl:    Certolizumab Pegol (CIMZIA PREFILLED SC) Inject into the skin.  Monthly injection- Dr. Mohsen Pereira Denies depression or anxiety. ENDOCRINOLOGIC:  Denies excessive sweating, cold or heat intolerance, polyuria or polydipsia.       EXAM:   /78 (BP Location: Left arm, Patient Position: Sitting, Cuff Size: large)   Pulse 60   Temp 98.1 °F (36.7 °C) (Te questions, complications, allergies, or worsening or changing symptoms. Patient is to call with any side effects or complications from the treatments as a result of today.      Problem List:  Patient Active Problem List:     Fatty liver     Encounter for britta

## 2019-05-11 RX ORDER — METOPROLOL SUCCINATE 25 MG/1
TABLET, EXTENDED RELEASE ORAL
Qty: 90 TABLET | Refills: 3 | Status: SHIPPED | OUTPATIENT
Start: 2019-05-11 | End: 2020-05-04

## 2019-05-11 NOTE — TELEPHONE ENCOUNTER
Future appt:    Last Appointment:  4/16/2019  Last px: 12/14/18    Metoprolol refilled on 2/19/18 for one year    Total Cholesterol (mg/dL)   Date Value   03/09/2019 127     HDL Cholesterol (mg/dL)   Date Value   03/09/2019 51     LDL Cholesterol (mg/dL)

## 2019-06-01 ENCOUNTER — OFFICE VISIT (OUTPATIENT)
Dept: FAMILY MEDICINE CLINIC | Facility: CLINIC | Age: 64
End: 2019-06-01
Payer: MEDICARE

## 2019-06-01 VITALS
TEMPERATURE: 97 F | HEART RATE: 63 BPM | OXYGEN SATURATION: 96 % | RESPIRATION RATE: 20 BRPM | DIASTOLIC BLOOD PRESSURE: 66 MMHG | SYSTOLIC BLOOD PRESSURE: 122 MMHG

## 2019-06-01 DIAGNOSIS — J40 BRONCHITIS: Primary | ICD-10-CM

## 2019-06-01 DIAGNOSIS — R05.9 COUGH: ICD-10-CM

## 2019-06-01 PROCEDURE — 99214 OFFICE O/P EST MOD 30 MIN: CPT | Performed by: FAMILY MEDICINE

## 2019-06-01 RX ORDER — BENZONATATE 100 MG/1
100 CAPSULE ORAL 3 TIMES DAILY PRN
Qty: 30 CAPSULE | Refills: 0 | Status: SHIPPED | OUTPATIENT
Start: 2019-06-01 | End: 2019-12-27 | Stop reason: ALTCHOICE

## 2019-06-01 RX ORDER — AZITHROMYCIN 250 MG/1
TABLET, FILM COATED ORAL
Qty: 6 TABLET | Refills: 0 | Status: SHIPPED | OUTPATIENT
Start: 2019-06-01 | End: 2019-12-27 | Stop reason: ALTCHOICE

## 2019-06-01 NOTE — PATIENT INSTRUCTIONS
Recommend rest, plenty of fluids. Start antibiotics. Use Benzonatate as needed   Also use Robitussin or Mucinex as needed   Return to clinic in 1 week if no improvement. Sooner if symptoms gets worse.

## 2019-06-01 NOTE — PROGRESS NOTES
[facility] [place]  PROGRESS NOTE  Chief Complaint:   Patient presents with:  Cough  Ear Problem: fullness both ears but left is worse  Fever      HPI:   This is a 75year old female presents to clinic with complaint of cough, fever and no fulln Paternal Grandmother    • Cancer Paternal Grandmother    • Diabetes Paternal Grandfather    • Cancer Paternal Grandfather    • Cancer Brother    • Diabetes Brother      Allergies:    Cefaclor                    Comment:Other reaction(s): vomiting , diarrhe mouth 2 (two) times daily. Disp:  Rfl: 3   predniSONE 2.5 MG Oral Tab Take 2.5 mg by mouth every other day. Dr. Chino Pires  Disp:  Rfl:    Fluticasone Propionate 50 MCG/ACT Nasal Suspension 1 spray by Nasal route daily.  Disp:  Rfl:    Multiple Vitamins-Mine discharge   THROAT:  No post-pharyngeal  erythema or exudate. MOUTH:  No oral lesions or ulcerations, good dentition. NECK: Supple, no CLAD, no JVD, no thyromegaly. LYMPHATIC: No cervical lymphadenopathy, no other lymphadenopathy.   SKIN: No rashes, no s Encounter for general adult medical examination with abnormal findings     External hemorrhoids     Benign essential hypertension     Hypopotassemia     Iron deficiency anemia     Abnormal mammogram     Osteoarthrosis     Osteopenia of left forearm     Asy

## 2019-08-26 RX ORDER — POTASSIUM CHLORIDE 1500 MG/1
TABLET, EXTENDED RELEASE ORAL
Qty: 90 TABLET | Refills: 0 | Status: SHIPPED | OUTPATIENT
Start: 2019-08-26 | End: 2019-11-19

## 2019-08-26 NOTE — TELEPHONE ENCOUNTER
Future appt:    Last Appointment with provider:   4/16/2019  Last appointment at [facility]:  6/1/2019  Total Cholesterol (mg/dL)   Date Value   03/09/2019 127     HDL Cholesterol (mg/dL)   Date Value   03/09/2019 51     LDL Cholesterol (mg/dL)   Date Value   03/09/2019 52     Triglycerides (mg/dL)   Date Value   03/09/2019 119   08/16/2018 99     Lab Results   Component Value Date     08/16/2018    A1C 5.9 (H) 08/16/2018     Lab Results   Component Value Date    TSH 3.070 08/16/2018       No follow-ups on file. Last appt with Dr. Nasim Vann was 4/6/19 for hypertension    Last px was 12/14/18.

## 2019-09-13 ENCOUNTER — OFFICE VISIT (OUTPATIENT)
Dept: FAMILY MEDICINE CLINIC | Facility: CLINIC | Age: 64
End: 2019-09-13
Payer: MEDICARE

## 2019-09-13 ENCOUNTER — APPOINTMENT (OUTPATIENT)
Dept: LAB | Age: 64
End: 2019-09-13
Attending: FAMILY MEDICINE
Payer: MEDICARE

## 2019-09-13 VITALS
RESPIRATION RATE: 16 BRPM | SYSTOLIC BLOOD PRESSURE: 126 MMHG | HEART RATE: 62 BPM | TEMPERATURE: 97 F | DIASTOLIC BLOOD PRESSURE: 74 MMHG | OXYGEN SATURATION: 95 %

## 2019-09-13 DIAGNOSIS — E83.42 HYPOMAGNESEMIA: ICD-10-CM

## 2019-09-13 DIAGNOSIS — R10.9 RIGHT SIDED ABDOMINAL PAIN: Primary | ICD-10-CM

## 2019-09-13 DIAGNOSIS — K76.0 FATTY LIVER: ICD-10-CM

## 2019-09-13 DIAGNOSIS — R73.09 ELEVATED GLUCOSE: ICD-10-CM

## 2019-09-13 DIAGNOSIS — I10 BENIGN ESSENTIAL HYPERTENSION: ICD-10-CM

## 2019-09-13 DIAGNOSIS — Z23 NEED FOR INFLUENZA VACCINATION: ICD-10-CM

## 2019-09-13 DIAGNOSIS — Z78.0 ASYMPTOMATIC POSTMENOPAUSAL STATUS: ICD-10-CM

## 2019-09-13 PROBLEM — H35.3130 BILATERAL NONEXUDATIVE AGE-RELATED MACULAR DEGENERATION: Status: ACTIVE | Noted: 2017-02-27

## 2019-09-13 PROBLEM — H25.813 COMBINED FORMS OF AGE-RELATED CATARACT, BILATERAL: Status: ACTIVE | Noted: 2017-02-27

## 2019-09-13 LAB
ALBUMIN SERPL-MCNC: 4 G/DL (ref 3.4–5)
ALBUMIN/GLOB SERPL: 1.1 {RATIO} (ref 1–2)
ALP LIVER SERPL-CCNC: 166 U/L (ref 55–142)
ALT SERPL-CCNC: 53 U/L (ref 13–56)
ANION GAP SERPL CALC-SCNC: 8 MMOL/L (ref 0–18)
AST SERPL-CCNC: 35 U/L (ref 15–37)
BASOPHILS # BLD AUTO: 0.04 X10(3) UL (ref 0–0.2)
BASOPHILS NFR BLD AUTO: 0.5 %
BILIRUB SERPL-MCNC: 0.4 MG/DL (ref 0.1–2)
BILIRUB UR QL STRIP.AUTO: NEGATIVE
BUN BLD-MCNC: 20 MG/DL (ref 7–18)
BUN/CREAT SERPL: 24.1 (ref 10–20)
CALCIUM BLD-MCNC: 9 MG/DL (ref 8.5–10.1)
CHLORIDE SERPL-SCNC: 105 MMOL/L (ref 98–112)
CHOLEST SMN-MCNC: 175 MG/DL (ref ?–200)
CLARITY UR REFRACT.AUTO: CLEAR
CO2 SERPL-SCNC: 26 MMOL/L (ref 21–32)
CREAT BLD-MCNC: 0.83 MG/DL (ref 0.55–1.02)
DEPRECATED RDW RBC AUTO: 46.7 FL (ref 35.1–46.3)
EOSINOPHIL # BLD AUTO: 0.1 X10(3) UL (ref 0–0.7)
EOSINOPHIL NFR BLD AUTO: 1.2 %
ERYTHROCYTE [DISTWIDTH] IN BLOOD BY AUTOMATED COUNT: 15.8 % (ref 11–15)
EST. AVERAGE GLUCOSE BLD GHB EST-MCNC: 128 MG/DL (ref 68–126)
GLOBULIN PLAS-MCNC: 3.8 G/DL (ref 2.8–4.4)
GLUCOSE BLD-MCNC: 102 MG/DL (ref 70–99)
GLUCOSE UR STRIP.AUTO-MCNC: NEGATIVE MG/DL
HAV IGM SER QL: 1.6 MG/DL (ref 1.6–2.6)
HBA1C MFR BLD HPLC: 6.1 % (ref ?–5.7)
HCT VFR BLD AUTO: 39.9 % (ref 35–48)
HDLC SERPL-MCNC: 49 MG/DL (ref 40–59)
HGB BLD-MCNC: 12.7 G/DL (ref 12–16)
IMM GRANULOCYTES # BLD AUTO: 0.03 X10(3) UL (ref 0–1)
IMM GRANULOCYTES NFR BLD: 0.4 %
KETONES UR STRIP.AUTO-MCNC: NEGATIVE MG/DL
LDLC SERPL CALC-MCNC: 104 MG/DL (ref ?–100)
LEUKOCYTE ESTERASE UR QL STRIP.AUTO: NEGATIVE
LYMPHOCYTES # BLD AUTO: 1.94 X10(3) UL (ref 1–4)
LYMPHOCYTES NFR BLD AUTO: 24.2 %
M PROTEIN MFR SERPL ELPH: 7.8 G/DL (ref 6.4–8.2)
MCH RBC QN AUTO: 26.2 PG (ref 26–34)
MCHC RBC AUTO-ENTMCNC: 31.8 G/DL (ref 31–37)
MCV RBC AUTO: 82.3 FL (ref 80–100)
MONOCYTES # BLD AUTO: 0.54 X10(3) UL (ref 0.1–1)
MONOCYTES NFR BLD AUTO: 6.7 %
NEUTROPHILS # BLD AUTO: 5.38 X10 (3) UL (ref 1.5–7.7)
NEUTROPHILS # BLD AUTO: 5.38 X10(3) UL (ref 1.5–7.7)
NEUTROPHILS NFR BLD AUTO: 67 %
NITRITE UR QL STRIP.AUTO: NEGATIVE
NONHDLC SERPL-MCNC: 126 MG/DL (ref ?–130)
OSMOLALITY SERPL CALC.SUM OF ELEC: 291 MOSM/KG (ref 275–295)
PH UR STRIP.AUTO: 6 [PH] (ref 4.5–8)
PLATELET # BLD AUTO: 283 10(3)UL (ref 150–450)
POTASSIUM SERPL-SCNC: 4.2 MMOL/L (ref 3.5–5.1)
PROT UR STRIP.AUTO-MCNC: NEGATIVE MG/DL
RBC # BLD AUTO: 4.85 X10(6)UL (ref 3.8–5.3)
RBC UR QL AUTO: NEGATIVE
SODIUM SERPL-SCNC: 139 MMOL/L (ref 136–145)
SP GR UR STRIP.AUTO: 1.01 (ref 1–1.03)
TRIGL SERPL-MCNC: 110 MG/DL (ref 30–149)
TSI SER-ACNC: 1.46 MIU/ML (ref 0.36–3.74)
UROBILINOGEN UR STRIP.AUTO-MCNC: <2 MG/DL
VLDLC SERPL CALC-MCNC: 22 MG/DL (ref 0–30)
WBC # BLD AUTO: 8 X10(3) UL (ref 4–11)

## 2019-09-13 PROCEDURE — 80061 LIPID PANEL: CPT | Performed by: FAMILY MEDICINE

## 2019-09-13 PROCEDURE — G0008 ADMIN INFLUENZA VIRUS VAC: HCPCS | Performed by: FAMILY MEDICINE

## 2019-09-13 PROCEDURE — 81003 URINALYSIS AUTO W/O SCOPE: CPT | Performed by: FAMILY MEDICINE

## 2019-09-13 PROCEDURE — 99214 OFFICE O/P EST MOD 30 MIN: CPT | Performed by: FAMILY MEDICINE

## 2019-09-13 PROCEDURE — 90662 IIV NO PRSV INCREASED AG IM: CPT | Performed by: FAMILY MEDICINE

## 2019-09-13 PROCEDURE — 85025 COMPLETE CBC W/AUTO DIFF WBC: CPT | Performed by: FAMILY MEDICINE

## 2019-09-13 PROCEDURE — 83036 HEMOGLOBIN GLYCOSYLATED A1C: CPT | Performed by: FAMILY MEDICINE

## 2019-09-13 PROCEDURE — 80053 COMPREHEN METABOLIC PANEL: CPT | Performed by: FAMILY MEDICINE

## 2019-09-13 PROCEDURE — 84443 ASSAY THYROID STIM HORMONE: CPT | Performed by: FAMILY MEDICINE

## 2019-09-13 PROCEDURE — 36415 COLL VENOUS BLD VENIPUNCTURE: CPT | Performed by: FAMILY MEDICINE

## 2019-09-13 PROCEDURE — 83735 ASSAY OF MAGNESIUM: CPT | Performed by: FAMILY MEDICINE

## 2019-09-13 NOTE — PATIENT INSTRUCTIONS
rec fasting labs today    Flu vaccine today    Monitor abdominal pain, if increased pain, call accutely    F/u Mando Delgadoots re colonoscopy    shingrix-- shingle vaccine at pharmacy of choice    DPhysical due in December

## 2019-09-13 NOTE — PROGRESS NOTES
[facility] [place]  PROGRESS NOTE  Chief Complaint:   Patient presents with:  Pain: Pt states right sided pain between her breast and her hip      HPI:   This is a 75year old female coming in for medical f.u of right side pain.      Pt had take from Last 6 Encounters:  03/18/19 : 239 lb  12/14/18 : 236 lb  02/28/18 : 241 lb 8 oz  11/22/17 : 231 lb 2 oz  08/23/17 : 230 lb 2 oz  04/12/17 : 226 lb 4 oz    Past Medical History:   Diagnosis Date   • Anxiety    • Arthritis    • Cellulitis of toe of lef reaction(s): vomiting , diarrhea  Phenylbutazone              Comment:Other reaction(s): sores in mouth  Sulfa Antibiotics       RASH  Hydroxychloroquine          Comment:Other reaction(s):  Other (See Comments)             Vision changes  Other (two) times daily. Disp:  Rfl:    Folic Acid-Vit U4-GPG A86 (VIRT-FATOU) 2.5-25-1 MG Oral Tab Take 1 tablet by mouth daily. Disp:  Rfl:    Calcium Citrate-Vitamin D (CITRACAL MAXIMUM) 315-250 MG-UNIT Oral Tab Take 1 tablet by mouth 2 (two) times daily.    Gale Valles dependent  HEENT:  Head:  Normocephalic, atraumatic  NECK: Supple,  SKIN: No rashes, no skin lesion, no bruising, good turgor. HEART:  Regular rate and rhythm, no murmurs, rubs or gallops.   LUNGS: Clear to auscultation bilterally, no rales/rhonchi/wheezin positional vertigo     Coagulopathy (HCC)     PE (pulmonary thromboembolism) ([facility]     Acute deep vein thrombosis (DVT) of popliteal vein of left lower extremity (HCC)     Cataract     Left leg swelling     Chronic seasonal allergic rhinitis     Pressure u

## 2019-09-21 RX ORDER — OXYBUTYNIN CHLORIDE 5 MG/1
TABLET, EXTENDED RELEASE ORAL
Qty: 180 TABLET | Refills: 1 | Status: SHIPPED | OUTPATIENT
Start: 2019-09-21 | End: 2020-03-16

## 2019-09-21 NOTE — TELEPHONE ENCOUNTER
Future appt:     Your appointments     Date & Time Appointment Department [facility])    Dec 27, 2019  8:00 AM CST Medicare Annual Well Visit with Patt Villanueva MD [address] [facility], Ivanna Alvarado [address] [place] [address] Ivanna Alvarado            Ed

## 2019-10-04 RX ORDER — ESCITALOPRAM OXALATE 10 MG/1
TABLET ORAL
Qty: 90 TABLET | Refills: 1 | Status: SHIPPED | OUTPATIENT
Start: 2019-10-04 | End: 2020-03-30

## 2019-10-04 NOTE — TELEPHONE ENCOUNTER
Future appt:     Your appointments     Date & Time Appointment Department [facility])    Dec 27, 2019  8:00 AM CST Medicare Annual Well Visit with Chata Lawrence MD [address], [facility] [address] [place] [address]            Ed

## 2019-11-19 RX ORDER — POTASSIUM CHLORIDE 1500 MG/1
TABLET, EXTENDED RELEASE ORAL
Qty: 90 TABLET | Refills: 1 | Status: SHIPPED | OUTPATIENT
Start: 2019-11-19 | End: 2020-05-13

## 2019-11-19 NOTE — TELEPHONE ENCOUNTER
Future appt: Your appointments     Date & Time Appointment Department [facility])    Dec 27, 2019  8:00 AM CST Medicare Annual Well Visit with Joce Baca MD [address], Dalton Waller ([facility])            [address] [facility] [address] [id number] [phone]        Last Appointment with provider:   9/13/2019  Last appointment at [facility]:  9/13/2019    Connie Giles refilled on 8/26/19 for #90    Cholesterol, Total (mg/dL)   Date Value   09/13/2019 175     Total Cholesterol (mg/dL)   Date Value   03/09/2019 127     HDL Cholesterol (mg/dL)   Date Value   09/13/2019 49   03/09/2019 51     LDL Cholesterol (mg/dL)   Date Value   09/13/2019 104 (H)   03/09/2019 52     Triglycerides (mg/dL)   Date Value   09/13/2019 110   03/09/2019 119     Lab Results   Component Value Date     (H) 09/13/2019    A1C 6.1 (H) 09/13/2019     Lab Results   Component Value Date    TSH 1.460 09/13/2019       No follow-ups on file.

## 2019-11-22 ENCOUNTER — PATIENT OUTREACH (OUTPATIENT)
Dept: CASE MANAGEMENT | Age: 64
End: 2019-11-22

## 2019-11-26 ENCOUNTER — PATIENT OUTREACH (OUTPATIENT)
Dept: CASE MANAGEMENT | Age: 64
End: 2019-11-26

## 2019-11-26 DIAGNOSIS — M15.9 PRIMARY OSTEOARTHRITIS INVOLVING MULTIPLE JOINTS: ICD-10-CM

## 2019-11-26 DIAGNOSIS — M05.79 RHEUMATOID ARTHRITIS INVOLVING MULTIPLE SITES WITH POSITIVE RHEUMATOID FACTOR (HCC): ICD-10-CM

## 2019-11-26 DIAGNOSIS — M85.832 OSTEOPENIA OF LEFT FOREARM: ICD-10-CM

## 2019-11-26 DIAGNOSIS — I10 BENIGN ESSENTIAL HYPERTENSION: ICD-10-CM

## 2019-11-26 NOTE — PROGRESS NOTES
I want to know a little about you. • What do you do for fun? Read, watch movies, and geneology  • Any hobbies? What Hobbies? Same as above  • What do you do for work? Retired - worked at [employer] for 26 years.  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 Yes  • Any special diet you are put on?  (cardiac, Dm diet, etc) Not yet  o Do you have any barriers to keeping with this diet?  Not sure what can and can't eat  o Can you tell me why you think yo help you meet your needs. • Remember: what works for one person may not always work for another. We will continue to work on what will help you meet your needs.     Care manager f/up:  Work on for next time: Dietary changes  Resources needed: Yes - see abo

## 2019-11-30 PROCEDURE — 99490 CHRNC CARE MGMT STAFF 1ST 20: CPT

## 2019-12-03 RX ORDER — OMEPRAZOLE 40 MG/1
CAPSULE, DELAYED RELEASE ORAL
Qty: 90 CAPSULE | Refills: 3 | Status: SHIPPED | OUTPATIENT
Start: 2019-12-03 | End: 2020-11-30

## 2019-12-03 NOTE — TELEPHONE ENCOUNTER
Future appt:     Your appointments     Date & Time Appointment Department [facility])    Dec 27, 2019  8:00 AM CST Medicare Annual Well Visit with Nora Dent MD [address] [place] [address]            Ed

## 2019-12-18 ENCOUNTER — PATIENT OUTREACH (OUTPATIENT)
Dept: CASE MANAGEMENT | Age: 64
End: 2019-12-18

## 2019-12-26 ENCOUNTER — PATIENT OUTREACH (OUTPATIENT)
Dept: CASE MANAGEMENT | Age: 64
End: 2019-12-26

## 2019-12-27 ENCOUNTER — OFFICE VISIT (OUTPATIENT)
Dept: FAMILY MEDICINE CLINIC | Facility: CLINIC | Age: 64
End: 2019-12-27
Payer: MEDICARE

## 2019-12-27 ENCOUNTER — APPOINTMENT (OUTPATIENT)
Dept: LAB | Age: 64
End: 2019-12-27
Attending: FAMILY MEDICINE
Payer: MEDICARE

## 2019-12-27 VITALS
DIASTOLIC BLOOD PRESSURE: 76 MMHG | OXYGEN SATURATION: 96 % | WEIGHT: 236.38 LBS | RESPIRATION RATE: 16 BRPM | HEART RATE: 64 BPM | TEMPERATURE: 97 F | SYSTOLIC BLOOD PRESSURE: 124 MMHG

## 2019-12-27 DIAGNOSIS — Z13.31 DEPRESSION SCREENING: ICD-10-CM

## 2019-12-27 DIAGNOSIS — L89.893 PRESSURE ULCER OF TOE OF LEFT FOOT, STAGE 3 (HCC): ICD-10-CM

## 2019-12-27 DIAGNOSIS — M05.79 RHEUMATOID ARTHRITIS INVOLVING MULTIPLE SITES WITH POSITIVE RHEUMATOID FACTOR (HCC): ICD-10-CM

## 2019-12-27 DIAGNOSIS — Z00.00 ENCOUNTER FOR ANNUAL HEALTH EXAMINATION: ICD-10-CM

## 2019-12-27 DIAGNOSIS — Z78.0 MENOPAUSE: ICD-10-CM

## 2019-12-27 DIAGNOSIS — H35.3130 BILATERAL NONEXUDATIVE AGE-RELATED MACULAR DEGENERATION, UNSPECIFIED STAGE: ICD-10-CM

## 2019-12-27 DIAGNOSIS — E87.6 HYPOPOTASSEMIA: ICD-10-CM

## 2019-12-27 DIAGNOSIS — I10 BENIGN ESSENTIAL HYPERTENSION: ICD-10-CM

## 2019-12-27 DIAGNOSIS — Z86.711 HISTORY OF PULMONARY EMBOLUS (PE): ICD-10-CM

## 2019-12-27 DIAGNOSIS — Z00.00 ENCOUNTER FOR ANNUAL HEALTH EXAMINATION: Primary | ICD-10-CM

## 2019-12-27 DIAGNOSIS — D50.9 IRON DEFICIENCY ANEMIA, UNSPECIFIED IRON DEFICIENCY ANEMIA TYPE: ICD-10-CM

## 2019-12-27 DIAGNOSIS — D68.9 COAGULOPATHY (HCC): ICD-10-CM

## 2019-12-27 DIAGNOSIS — R73.09 ABNORMAL GLUCOSE: ICD-10-CM

## 2019-12-27 DIAGNOSIS — E55.9 VITAMIN D DEFICIENCY: ICD-10-CM

## 2019-12-27 PROBLEM — M79.89 LEFT LEG SWELLING: Status: RESOLVED | Noted: 2017-04-24 | Resolved: 2019-12-27

## 2019-12-27 PROBLEM — H26.9 CATARACT: Status: RESOLVED | Noted: 2017-04-12 | Resolved: 2019-12-27

## 2019-12-27 PROBLEM — H25.813 COMBINED FORMS OF AGE-RELATED CATARACT, BILATERAL: Status: RESOLVED | Noted: 2017-02-27 | Resolved: 2019-12-27

## 2019-12-27 PROCEDURE — 83036 HEMOGLOBIN GLYCOSYLATED A1C: CPT | Performed by: FAMILY MEDICINE

## 2019-12-27 PROCEDURE — 83540 ASSAY OF IRON: CPT | Performed by: FAMILY MEDICINE

## 2019-12-27 PROCEDURE — 83735 ASSAY OF MAGNESIUM: CPT | Performed by: FAMILY MEDICINE

## 2019-12-27 PROCEDURE — 80061 LIPID PANEL: CPT | Performed by: FAMILY MEDICINE

## 2019-12-27 PROCEDURE — 84443 ASSAY THYROID STIM HORMONE: CPT | Performed by: FAMILY MEDICINE

## 2019-12-27 PROCEDURE — 81001 URINALYSIS AUTO W/SCOPE: CPT

## 2019-12-27 PROCEDURE — 82570 ASSAY OF URINE CREATININE: CPT

## 2019-12-27 PROCEDURE — 82306 VITAMIN D 25 HYDROXY: CPT | Performed by: FAMILY MEDICINE

## 2019-12-27 PROCEDURE — 82043 UR ALBUMIN QUANTITATIVE: CPT

## 2019-12-27 PROCEDURE — 85025 COMPLETE CBC W/AUTO DIFF WBC: CPT | Performed by: FAMILY MEDICINE

## 2019-12-27 PROCEDURE — 83550 IRON BINDING TEST: CPT | Performed by: FAMILY MEDICINE

## 2019-12-27 PROCEDURE — 80053 COMPREHEN METABOLIC PANEL: CPT | Performed by: FAMILY MEDICINE

## 2019-12-27 PROCEDURE — 82728 ASSAY OF FERRITIN: CPT | Performed by: FAMILY MEDICINE

## 2019-12-27 PROCEDURE — 36415 COLL VENOUS BLD VENIPUNCTURE: CPT | Performed by: FAMILY MEDICINE

## 2019-12-27 PROCEDURE — G0438 PPPS, INITIAL VISIT: HCPCS | Performed by: FAMILY MEDICINE

## 2019-12-27 NOTE — PROGRESS NOTES
CC: Annual Physical Exam    HPI:   Sari Campbell is a 75year old female who presents for a complete physical exam.  Patient complains of tired from [facility] fatigue. Pt with celiac ds in Sept-- symptoms better with diet change.   Pt fasting today for labs PANEL   Result Value Ref Range    Cholesterol, Total 175 <200 mg/dL    HDL Cholesterol 49 40 - 59 mg/dL    Triglycerides 110 30 - 149 mg/dL    LDL Cholesterol 104 (H) <100 mg/dL    VLDL 22 0 - 30 mg/dL    Non HDL Chol 126 <130 mg/dL   TSH W REFLEX TO FREE 0.20 x10(3) uL    Immature Granulocyte Absolute 0.03 0.00 - 1.00 x10(3) uL    Neutrophil % 67.0 %    Lymphocyte % 24.2 %    Monocyte % 6.7 %    Eosinophil % 1.2 %    Basophil % 0.5 %    Immature Granulocyte % 0.4 %       Current Outpatient Medications   Me mouth  Sulfa Antibiotics       RASH  Hydroxychloroquine          Comment:Other reaction(s): Other (See Comments)             Vision changes  Other                       Comment:Other reaction(s):  Other (See Comments)             Butazolidin   Past Medical Diet: Celiac diet       General Health     In the past six months, have you lost more than 10 pounds without trying?: 2 - No    Has your appetite been poor?: No    Type of Diet: Other    How does the patient maintain a good energy level?: Stretching    How Rub Result:  Pass             Visual Acuity                   Cognitive Assessment     What day of the week is this?: Correct    What month is it?: Correct    What year is it?: Correct    Recall \"Ball\": Correct    Recall \"Flag\": Correct    Recall \"Emeka Wheelchair-bound patient was able to get up to weigh herself on the scale with assist in monitoring x2.   HEENT:  Head:  Normocephalic, atraumatic   Eyes: EOMI, PERRLA, no scleral icterus, conjunctivae clear bilaterally, no eye discharge   Ears: TM's clear ANNUAL    3. Benign essential hypertension  Pressure stable  - CBC WITH DIFFERENTIAL WITH PLATELET; Future  - COMP METABOLIC PANEL (14); Future  - LIPID PANEL;  Future  - TSH W REFLEX TO FREE T4; Future  - URINALYSIS WITH CULTURE REFLEX; Future  - MAGNESIUM hypertension  History of pulmonary embolus (pe)  Pressure ulcer of toe of left foot, stage 3 (hcc)  Coagulopathy (hcc)  Menopause  Rheumatoid arthritis involving multiple sites with positive rheumatoid factor (hcc)  Abnormal glucose  Vitamin d deficiency covered if needed at [facility], and non-screening if indicated for medical reasons Electrocardiogram date12/14/2018 Routine EKG is not a screening covered service except at the Welcome to Medicare Visit    Abdominal aortic aneurysm screening (onc and Pelvic      Pap: Every 3 yrs age 21-65 or Pap+HPV every 5 yrs age 33-67, Covered every 2 yrs up to age 79 or Yearly if High Risk   Pap Smear,1 Year due on 11/22/2018     Chlamydia  Annually if high risk No results found for: CHLAMYDIA No flowsheet data link to the [employer]. This site has a lot of good information including definitions of the different types of Advance Directives.  It also has the State forms available on it's website for anyone to review and print using their home co

## 2019-12-27 NOTE — PATIENT INSTRUCTIONS
rec DEXA scan    Fasting labs today    Continue medications pending lab results      Lori Torres's SCREENING SCHEDULE   Tests on this list are recommended by your physician but may not be covered, or covered at this frequency, by your insurer.  Ramy smoked more than 100 cigarettes in their lifetime   • Anyone with a family history    Colorectal Cancer Screening  Covered up to Age 76     Colonoscopy Screen   Covered every 10 years- more often if abnormal Colonoscopy due on 10/30/2024 Update Health Main Immunizations      Influenza  Covered Annually Orders placed or performed in visit on 09/13/19   • FLU VACC HIGH DOSE PRSV FREE    Please get every year    Pneumococcal 13 (Prevnar)  Covered Once after 65 No orders found for this or any previous visit.  P Directives.

## 2019-12-31 ENCOUNTER — PATIENT OUTREACH (OUTPATIENT)
Dept: CASE MANAGEMENT | Age: 64
End: 2019-12-31

## 2019-12-31 DIAGNOSIS — M05.79 RHEUMATOID ARTHRITIS INVOLVING MULTIPLE SITES WITH POSITIVE RHEUMATOID FACTOR (HCC): ICD-10-CM

## 2019-12-31 DIAGNOSIS — M15.9 PRIMARY OSTEOARTHRITIS INVOLVING MULTIPLE JOINTS: ICD-10-CM

## 2019-12-31 DIAGNOSIS — E87.6 HYPOPOTASSEMIA: ICD-10-CM

## 2019-12-31 DIAGNOSIS — K76.0 FATTY LIVER: ICD-10-CM

## 2019-12-31 DIAGNOSIS — I10 BENIGN ESSENTIAL HYPERTENSION: ICD-10-CM

## 2019-12-31 DIAGNOSIS — E83.42 HYPOMAGNESEMIA: ICD-10-CM

## 2019-12-31 PROCEDURE — 99490 CHRNC CARE MGMT STAFF 1ST 20: CPT

## 2019-12-31 NOTE — PROGRESS NOTES
12/31/2019  Spoke to Wily for CCM.       Updates to patient care team/ comments: None  Patient reported changes in medications: None  Med Adherence  Comment: Taking as directed     Health Maintenance: Reviewed  DEXA Scan due on 01/18/2018 - pt has order  P And Concerns: Has to be seated exercises/stretches                   - Plan for overcoming barriers: Using handouts mailed to pt. Patient agrees to goal action plan.   Self-Management Abilities (patient reported)             1= least confident in

## 2020-01-06 RX ORDER — TRIAMTERENE AND HYDROCHLOROTHIAZIDE 37.5; 25 MG/1; MG/1
CAPSULE ORAL
Qty: 90 CAPSULE | Refills: 2 | Status: SHIPPED | OUTPATIENT
Start: 2020-01-06 | End: 2020-09-28

## 2020-01-06 NOTE — TELEPHONE ENCOUNTER
Future appt:     Your appointments     Date & Time Appointment Department [facility])    Jan 07, 2020 10:30 AM CST [facility])    It is recommended that you wear a 2 piece outfit t (mg/dL)   Date Value   12/27/2019 114     Total Cholesterol (mg/dL)   Date Value   03/09/2019 127     HDL Cholesterol (mg/dL)   Date Value   12/27/2019 43   03/09/2019 51     LDL Cholesterol (mg/dL)   Date Value   12/27/2019 47   03/09/2019 52     Triglyce

## 2020-01-07 ENCOUNTER — HOSPITAL ENCOUNTER (OUTPATIENT)
Dept: BONE DENSITY | Age: 65
Discharge: HOME | End: 2020-01-07
Attending: FAMILY MEDICINE
Payer: MEDICARE

## 2020-01-07 DIAGNOSIS — Z78.0 MENOPAUSE: ICD-10-CM

## 2020-01-07 PROCEDURE — 77080 DXA BONE DENSITY AXIAL: CPT | Performed by: FAMILY MEDICINE

## 2020-01-16 ENCOUNTER — TELEPHONE (OUTPATIENT)
Dept: FAMILY MEDICINE CLINIC | Facility: CLINIC | Age: 65
End: 2020-01-16

## 2020-01-16 DIAGNOSIS — M05.79 RHEUMATOID ARTHRITIS INVOLVING MULTIPLE SITES WITH POSITIVE RHEUMATOID FACTOR (HCC): Primary | ICD-10-CM

## 2020-01-16 NOTE — TELEPHONE ENCOUNTER
Long story, needs a prescription for new wheel chair, Risa Dominguez said you can just fax to them.   There fax # is [phone]

## 2020-01-17 NOTE — TELEPHONE ENCOUNTER
Future Appointments   Date Time Provider Syd Koch   1/24/2020  9:30 AM Candice Waddell MD [facility] to start orders for wheelchair-- may need specifics for order-- general order entered- please sent to [facility]. and will

## 2020-01-17 NOTE — TELEPHONE ENCOUNTER
Pt states wheel from her wheelchair popped off while she was at work yesterday. Pt fell out of the chair, pt hit head and ended up in ER. Pt had CT and states that was fine, no bleeding. Pt needs new Wheelchair.     Pt scheduled for post ER visit  Pt i

## 2020-01-22 ENCOUNTER — PATIENT OUTREACH (OUTPATIENT)
Dept: CASE MANAGEMENT | Age: 65
End: 2020-01-22

## 2020-01-22 DIAGNOSIS — I10 BENIGN ESSENTIAL HYPERTENSION: ICD-10-CM

## 2020-01-22 DIAGNOSIS — M15.9 PRIMARY OSTEOARTHRITIS INVOLVING MULTIPLE JOINTS: ICD-10-CM

## 2020-01-22 DIAGNOSIS — M05.79 RHEUMATOID ARTHRITIS INVOLVING MULTIPLE SITES WITH POSITIVE RHEUMATOID FACTOR (HCC): ICD-10-CM

## 2020-01-22 NOTE — PROGRESS NOTES
1/22/2020  Spoke to Wily for CCM.       Updates to patient care team/ comments: None  Patient reported changes in medications: None  Med Adherence  Comment: Taking as directed     Health Maintenance: Reviewed  Pap Smear,1 Year due on 11/22/2018  Mammogram barriers: n/a            Patient agrees to goal action plan.   Self-Management Abilities (patient reported)             1= least confident in achieving goal, 5= very confident               - confidence: : 4      Care Manager Follow Up: 1 month  Reason For

## 2020-01-24 ENCOUNTER — OFFICE VISIT (OUTPATIENT)
Dept: FAMILY MEDICINE CLINIC | Facility: CLINIC | Age: 65
End: 2020-01-24
Payer: MEDICARE

## 2020-01-24 VITALS
HEIGHT: 62.5 IN | BODY MASS INDEX: 43 KG/M2 | SYSTOLIC BLOOD PRESSURE: 114 MMHG | DIASTOLIC BLOOD PRESSURE: 70 MMHG | HEART RATE: 72 BPM | RESPIRATION RATE: 16 BRPM

## 2020-01-24 DIAGNOSIS — M15.9 PRIMARY OSTEOARTHRITIS INVOLVING MULTIPLE JOINTS: ICD-10-CM

## 2020-01-24 DIAGNOSIS — G62.9 NEUROPATHY: ICD-10-CM

## 2020-01-24 DIAGNOSIS — S09.90XD CLOSED HEAD INJURY, SUBSEQUENT ENCOUNTER: ICD-10-CM

## 2020-01-24 DIAGNOSIS — D68.9 COAGULOPATHY (HCC): ICD-10-CM

## 2020-01-24 DIAGNOSIS — M62.81 MUSCLE WEAKNESS AFFECTING MOVEMENT OF FOOT: ICD-10-CM

## 2020-01-24 DIAGNOSIS — I82.432 ACUTE DEEP VEIN THROMBOSIS (DVT) OF POPLITEAL VEIN OF LEFT LOWER EXTREMITY (HCC): ICD-10-CM

## 2020-01-24 DIAGNOSIS — M05.79 RHEUMATOID ARTHRITIS INVOLVING MULTIPLE SITES WITH POSITIVE RHEUMATOID FACTOR (HCC): Primary | ICD-10-CM

## 2020-01-24 PROCEDURE — 99214 OFFICE O/P EST MOD 30 MIN: CPT | Performed by: FAMILY MEDICINE

## 2020-01-24 RX ORDER — MELATONIN
325
COMMUNITY
End: 2021-05-06

## 2020-01-24 NOTE — PROGRESS NOTES
Lauren Blanc is a 77year old female. Patient presents with:  ER F/U: at work wheel on wheelchair fell off and hit right side of head on 1/16/20 went NW er  Other: needs new order for a wheelchair      HPI:     Pt with RA since age 13.  Pt with wheelcha mouth daily with breakfast.     • TRIAMTERENE-HCTZ 37.5-25 MG Oral Cap TAKE 1 CAPSULE BY MOUTH EVERY DAY 90 capsule 2   • OMEPRAZOLE 40 MG Oral Capsule Delayed Release TAKE 1 CAPSULE BY MOUTH EVERY DAY 90 capsule 3   • KLOR-CON M20 20 MEQ Oral Tab CR TAKE Tendon repair to R hand   • TOTAL HIP REPLACEMENT      x4 in L hip   • TOTAL KNEE REPLACEMENT      x3 in L knee and x1 in R knee      Social History:  Smoking: Denies     Results for orders placed or performed in visit on 12/27/19   URINALYSIS WITH CULTURE Rheumatoid arthritis involving multiple sites with positive rheumatoid factor ([facility]      2. Primary osteoarthritis involving multiple joints      3.  Acute deep vein thrombosis (DVT) of popliteal vein of left lower extremity (HCC)      4. Coagulopathy ([facility]

## 2020-01-25 PROBLEM — G62.9 NEUROPATHY: Status: ACTIVE | Noted: 2020-01-25

## 2020-01-31 PROCEDURE — 99490 CHRNC CARE MGMT STAFF 1ST 20: CPT | Performed by: FAMILY MEDICINE

## 2020-02-19 ENCOUNTER — PATIENT OUTREACH (OUTPATIENT)
Dept: CASE MANAGEMENT | Age: 65
End: 2020-02-19

## 2020-03-04 ENCOUNTER — PATIENT OUTREACH (OUTPATIENT)
Dept: CASE MANAGEMENT | Age: 65
End: 2020-03-04

## 2020-03-04 NOTE — PROGRESS NOTES
Called patient for monthly CCM outreach, left message to call back.       Chart review - 2 min  Time with patient - 0 min  Total time - 2 min

## 2020-03-10 ENCOUNTER — TELEPHONE (OUTPATIENT)
Dept: FAMILY MEDICINE CLINIC | Facility: CLINIC | Age: 65
End: 2020-03-10

## 2020-03-10 ENCOUNTER — PATIENT OUTREACH (OUTPATIENT)
Dept: CASE MANAGEMENT | Age: 65
End: 2020-03-10

## 2020-03-10 DIAGNOSIS — D50.9 IRON DEFICIENCY ANEMIA, UNSPECIFIED IRON DEFICIENCY ANEMIA TYPE: ICD-10-CM

## 2020-03-10 DIAGNOSIS — M05.79 RHEUMATOID ARTHRITIS INVOLVING MULTIPLE SITES WITH POSITIVE RHEUMATOID FACTOR (HCC): ICD-10-CM

## 2020-03-10 DIAGNOSIS — K76.0 FATTY LIVER: ICD-10-CM

## 2020-03-10 DIAGNOSIS — M15.9 PRIMARY OSTEOARTHRITIS INVOLVING MULTIPLE JOINTS: ICD-10-CM

## 2020-03-10 DIAGNOSIS — I10 BENIGN ESSENTIAL HYPERTENSION: ICD-10-CM

## 2020-03-10 DIAGNOSIS — E04.9 GOITER: ICD-10-CM

## 2020-03-10 DIAGNOSIS — R73.09 ABNORMAL GLUCOSE: ICD-10-CM

## 2020-03-10 DIAGNOSIS — E83.42 HYPOMAGNESEMIA: ICD-10-CM

## 2020-03-10 DIAGNOSIS — E87.6 HYPOPOTASSEMIA: ICD-10-CM

## 2020-03-10 DIAGNOSIS — I10 BENIGN ESSENTIAL HYPERTENSION: Primary | ICD-10-CM

## 2020-03-10 NOTE — PROGRESS NOTES
3/10/2020  Spoke to Wily for CCM.       Updates to patient care team/ comments: None  Patient reported changes in medications: None  Med Adherence  Comment: Taking as directed     Health Maintenance: Reviewed  Pap Smear,1 Year due on 11/22/2018  Mammogram is she due back for labs. Time Spent This Encounter Total: 17 min medical record review, telephone communication, care plan updates where needed, education, goals and action plan recreation/update.  Provided acknowledgment and validation to patient's sarah

## 2020-03-10 NOTE — TELEPHONE ENCOUNTER
Pt spoke with Ondina Gilbert (nurse with [facility] )  Pt states she was placed on iron supplement in end of December- is taking one daily. Other than fatigue, pt is doing better. Javy Mejia states she is seeing Dr. Rhoda Ernst- had colonoscopy.   Pt was dx: with brigitte

## 2020-03-10 NOTE — TELEPHONE ENCOUNTER
While speaking to pt for monthly CCM outreach pt stated she does have some fatigue, she can sleep all night and still nap during the day. Pt wanted to know when she is due back for a follow up visit and labs. Please review and advise.

## 2020-03-11 NOTE — TELEPHONE ENCOUNTER
Chart reviewed  Pt is due for general medical f.u end of June. - plan for lab panel then.  If pt with concerns- then prob focus visit as needed

## 2020-03-16 RX ORDER — OXYBUTYNIN CHLORIDE 5 MG/1
TABLET, EXTENDED RELEASE ORAL
Qty: 180 TABLET | Refills: 1 | Status: SHIPPED | OUTPATIENT
Start: 2020-03-16 | End: 2020-09-19

## 2020-03-16 NOTE — TELEPHONE ENCOUNTER
Future appt:    Last Appointment with provider:   1/24/2020  Last appointment at [facility]:  1/24/2020  Last annual exam : 12/27/19- pt was advised to f/u in 6 months    Oxybutynin refilled on 9/21/19 for 6 month supply    Cholesterol, Total (mg/dL)   D

## 2020-03-30 RX ORDER — ESCITALOPRAM OXALATE 10 MG/1
TABLET ORAL
Qty: 90 TABLET | Refills: 1 | Status: SHIPPED | OUTPATIENT
Start: 2020-03-30 | End: 2020-09-28

## 2020-03-30 NOTE — TELEPHONE ENCOUNTER
Future appt:    Last Appointment with provider:   1/24/2020  Last appointment at [facility] [place]:  1/24/2020  Last px:  12/27/19-  Pt was advised to f/u in 6 months, pending labs.   Labs drawn 12/27/19    Escitalopram refilled on 10/4/19 for #90 with 1 refill

## 2020-03-31 PROCEDURE — 99490 CHRNC CARE MGMT STAFF 1ST 20: CPT

## 2020-04-21 ENCOUNTER — TELEPHONE (OUTPATIENT)
Dept: FAMILY MEDICINE CLINIC | Facility: CLINIC | Age: 65
End: 2020-04-21

## 2020-04-21 NOTE — TELEPHONE ENCOUNTER
Pt contacted,  Pt consents to virtual visit with CR>  Pt preferred telephone visit-     Pt scheduled for appt tomorrow, 4/22 afternoon. Screening questions asked. Pt states she has long history of joint pain that is not new.   Pt states she has diarrhea-

## 2020-04-21 NOTE — TELEPHONE ENCOUNTER
Lab results from rheumatolgy added to file. Elevated liver function noted. Pt to be contacted.  I rec appt for medical followup- can be video or phone

## 2020-04-22 ENCOUNTER — VIRTUAL PHONE E/M (OUTPATIENT)
Dept: FAMILY MEDICINE CLINIC | Facility: CLINIC | Age: 65
End: 2020-04-22

## 2020-04-22 DIAGNOSIS — R79.89 ELEVATED LIVER FUNCTION TESTS: ICD-10-CM

## 2020-04-22 DIAGNOSIS — M05.79 RHEUMATOID ARTHRITIS INVOLVING MULTIPLE SITES WITH POSITIVE RHEUMATOID FACTOR (HCC): ICD-10-CM

## 2020-04-22 DIAGNOSIS — K76.0 FATTY LIVER: Primary | ICD-10-CM

## 2020-04-22 PROBLEM — K90.0 CELIAC DISEASE: Status: ACTIVE | Noted: 2020-04-22

## 2020-04-22 PROCEDURE — 99442 PHONE E/M BY PHYS 11-20 MIN: CPT | Performed by: FAMILY MEDICINE

## 2020-04-22 RX ORDER — PREDNISONE 1 MG/1
5 TABLET ORAL DAILY
COMMUNITY
Start: 2020-02-28

## 2020-04-22 NOTE — PATIENT INSTRUCTIONS
Patient advised to decrease use of Tylenol to 1 Tylenol arthritis strength tablet or 650mg in the morning and then another in the evening only if needed.   Patient advised to do this for the next month and then plan for follow-up laboratory studies at her f

## 2020-04-22 NOTE — PROGRESS NOTES
Rose Cabrera  verbally consents to a Virtual/Telephone Check-In service on 4/22/2020. Patient understands and accepts financial responsibility for any deductible, co-insurance and/or co-pays associated with this service.     Duration of the service: 14 MG Oral Tab EC Take 325 mg by mouth daily with breakfast.     • TRIAMTERENE-HCTZ 37.5-25 MG Oral Cap TAKE 1 CAPSULE BY MOUTH EVERY DAY 90 capsule 2   • OMEPRAZOLE 40 MG Oral Capsule Delayed Release TAKE 1 CAPSULE BY MOUTH EVERY DAY 90 capsule 3   • KLOR-CO History:  Smoking: Denies     Results for orders placed or performed in visit on 04/22/20   CBC WITH DIFFERENTIAL WITH PLATELET   Result Value Ref Range    WBC 6.0     NUCRBC 0.00     RED BLOOD COUNT 5.3     HGB 14.5     HCT 46.3     MEAN CELL VOLUME 88 possibly from tylenol use    Patient Instructions   Patient advised to decrease use of Tylenol to 1 Tylenol arthritis strength tablet or 650mg in the morning and then another in the evening only if needed.   Patient advised to do this for the next month and

## 2020-04-23 ENCOUNTER — PATIENT OUTREACH (OUTPATIENT)
Dept: CASE MANAGEMENT | Age: 65
End: 2020-04-23

## 2020-05-04 RX ORDER — METOPROLOL SUCCINATE 25 MG/1
TABLET, EXTENDED RELEASE ORAL
Qty: 90 TABLET | Refills: 3 | Status: SHIPPED | OUTPATIENT
Start: 2020-05-04 | End: 2021-01-29

## 2020-05-04 NOTE — TELEPHONE ENCOUNTER
Future appt:    Last Appointment with provider:   1/24/2020  Last appointment at [facility]:  1/24/2020  Last annual:  12/27/19      Metoprolol refilled on 5/11/19 for one year            Cholesterol, Total (mg/dL)   Date Value   12/27/2019 114     Total

## 2020-05-13 RX ORDER — POTASSIUM CHLORIDE 20 MEQ/1
TABLET, EXTENDED RELEASE ORAL
Qty: 90 TABLET | Refills: 0 | Status: SHIPPED | OUTPATIENT
Start: 2020-05-13 | End: 2020-08-11

## 2020-05-13 NOTE — TELEPHONE ENCOUNTER
Future appt:    Last Appointment with provider:   1/24/2020  Last appointment at [facility]:  1/24/2020  Last px:  12/27/19    Last labs done 4/16/20  Pt has lab order for CMP on file.     Potassium refill don 11/19/19 for #90 with one refill      Cholest

## 2020-05-17 ENCOUNTER — PATIENT MESSAGE (OUTPATIENT)
Dept: FAMILY MEDICINE CLINIC | Facility: CLINIC | Age: 65
End: 2020-05-17

## 2020-05-18 NOTE — TELEPHONE ENCOUNTER
From: Jordyn Saldivar  To: Konrad Tovar MD  Sent: 5/17/2020 7:29 PM CDT  Subject: Other    Dear Dr. Neelima Contreras and Laura Kyle,    My  Elvira Perez has taken 2 weeks off of work at [place] [id number]. to help me out as I have been In a RA flare and suffering extreme fatigue

## 2020-05-18 NOTE — TELEPHONE ENCOUNTER
I have no knowledge of patient having a change in status requiring additional care. My last contact with patient was April 23, 2020. Pt asking for  to have medical leave May 11-22, 2020.  Perhaps she has been getting health care from another provider

## 2020-05-19 NOTE — TELEPHONE ENCOUNTER
Pt informed. Pt states she will reach out to Dr. Jeane Casarez office. Pt states if needed, she will call back to schedule appointment for virtual visit with JOSHUA.

## 2020-05-21 ENCOUNTER — VIRTUAL PHONE E/M (OUTPATIENT)
Dept: FAMILY MEDICINE CLINIC | Facility: CLINIC | Age: 65
End: 2020-05-21

## 2020-05-21 DIAGNOSIS — R53.83 OTHER FATIGUE: ICD-10-CM

## 2020-05-21 DIAGNOSIS — M15.9 PRIMARY OSTEOARTHRITIS INVOLVING MULTIPLE JOINTS: ICD-10-CM

## 2020-05-21 DIAGNOSIS — R79.89 ELEVATED LIVER FUNCTION TESTS: ICD-10-CM

## 2020-05-21 DIAGNOSIS — M05.79 RHEUMATOID ARTHRITIS INVOLVING MULTIPLE SITES WITH POSITIVE RHEUMATOID FACTOR (HCC): Primary | ICD-10-CM

## 2020-05-21 PROCEDURE — 99441 PHONE E/M BY PHYS 5-10 MIN: CPT | Performed by: FAMILY MEDICINE

## 2020-05-22 PROBLEM — R53.83 OTHER FATIGUE: Status: ACTIVE | Noted: 2020-05-22

## 2020-05-22 NOTE — PATIENT INSTRUCTIONS
Pt to continue care as per Dr. Otilia Russo for RA.  Encourage increasing activity as able  Pt to f.u if further concerns

## 2020-05-22 NOTE — PROGRESS NOTES
Jag Peace is a 77year old female. Patient presents with:  Fatigue      Jag Peace  verbally consents to a Virtual/Telephone Check-In service on 5/22/2020.     Patient understands and accepts financial responsibility for any deductible, co-insura (65 FE) MG Oral Tab EC Take 325 mg by mouth daily with breakfast.     • TRIAMTERENE-HCTZ 37.5-25 MG Oral Cap TAKE 1 CAPSULE BY MOUTH EVERY DAY 90 capsule 2   • OMEPRAZOLE 40 MG Oral Capsule Delayed Release TAKE 1 CAPSULE BY MOUTH EVERY DAY 90 capsule 3   • Nitrogen2 24hr 20 mg/24 hr    CREATININE 0.8 mg/dL    GFR AFRICAN AMERICAN 92     GFR NON- 76     SODIUM 139     POTASSIUM 4.1     CHLORIDE 102     CO2 29     ANION GAP 8     CALCIUM 9.9     TOTAL PROTEIN 7.0     Albumin 4.4 g/dL    AST (SG decision making.   Appropriate medical decision-making and tests are ordered as detailed in the plan of care above

## 2020-06-24 ENCOUNTER — PATIENT OUTREACH (OUTPATIENT)
Dept: CASE MANAGEMENT | Age: 65
End: 2020-06-24

## 2020-06-25 ENCOUNTER — PATIENT OUTREACH (OUTPATIENT)
Dept: CASE MANAGEMENT | Age: 65
End: 2020-06-25

## 2020-06-25 DIAGNOSIS — I10 BENIGN ESSENTIAL HYPERTENSION: ICD-10-CM

## 2020-06-25 DIAGNOSIS — E87.6 HYPOPOTASSEMIA: ICD-10-CM

## 2020-06-25 DIAGNOSIS — K90.0 CELIAC DISEASE: ICD-10-CM

## 2020-06-25 DIAGNOSIS — K76.0 FATTY LIVER: ICD-10-CM

## 2020-06-25 DIAGNOSIS — R79.89 ELEVATED LIVER FUNCTION TESTS: ICD-10-CM

## 2020-06-25 DIAGNOSIS — M05.79 RHEUMATOID ARTHRITIS INVOLVING MULTIPLE SITES WITH POSITIVE RHEUMATOID FACTOR (HCC): ICD-10-CM

## 2020-06-25 DIAGNOSIS — E83.42 HYPOMAGNESEMIA: ICD-10-CM

## 2020-06-25 DIAGNOSIS — M15.9 PRIMARY OSTEOARTHRITIS INVOLVING MULTIPLE JOINTS: ICD-10-CM

## 2020-06-25 NOTE — PROGRESS NOTES
6/25/2020  Spoke to Eduarda Pineda for CCM.       Updates to patient care team/ comments: None  Patient reported changes in medications: None  Med Adherence  Comment: Taking as directed     Health Maintenance:  Reviewed  Pap Smear,1 Year due on 11/22/2018  Mammogram - confidence: : 3        Care Manager Follow Up: 1 month  Reason For Follow Up: review progress and or barriers towards patients goals. Care managers interventions: Praised pt on liver enzymes improvement and encouraged to drink more water.  A

## 2020-06-30 PROCEDURE — 99490 CHRNC CARE MGMT STAFF 1ST 20: CPT

## 2020-07-22 ENCOUNTER — PATIENT OUTREACH (OUTPATIENT)
Dept: CASE MANAGEMENT | Age: 65
End: 2020-07-22

## 2020-07-22 NOTE — MR AVS SNAPSHOT
[address]  [place] [address] [id number]  [phone]               Thank you for choosing us for your health care visit with Heather Noyola MD.  We are glad to serve you and happy to provide you with this summary PE (pulmonary thromboembolism)          Instructions and Information about Your Health    Pt to consult Dr. Barbara Monteiro re recurrent DVT.  And prior PE  Pt also needing preop for cataract    Pt to f.u with me after hematology eval       Allergies as of Mar 29, information, go to [email or web address] and click on the Sign Up Now link in the [employer] box. Enter your [employer] Activation Code exactly as it appears below along with your Zip Code and Date of Birth to complete the sign-up process.  If you do No

## 2020-07-30 ENCOUNTER — PATIENT OUTREACH (OUTPATIENT)
Dept: CASE MANAGEMENT | Age: 65
End: 2020-07-30

## 2020-07-30 NOTE — PROGRESS NOTES
Called patient for monthly CCM outreach, left message to call back.       Chart review - 2 min  Time with patient - 0 min  Total time - 7 min

## 2020-07-31 ENCOUNTER — PATIENT OUTREACH (OUTPATIENT)
Dept: CASE MANAGEMENT | Age: 65
End: 2020-07-31

## 2020-07-31 DIAGNOSIS — E83.42 HYPOMAGNESEMIA: ICD-10-CM

## 2020-07-31 DIAGNOSIS — M15.9 PRIMARY OSTEOARTHRITIS INVOLVING MULTIPLE JOINTS: ICD-10-CM

## 2020-07-31 DIAGNOSIS — K90.0 CELIAC DISEASE: ICD-10-CM

## 2020-07-31 DIAGNOSIS — I10 BENIGN ESSENTIAL HYPERTENSION: ICD-10-CM

## 2020-07-31 DIAGNOSIS — E04.9 GOITER: ICD-10-CM

## 2020-07-31 DIAGNOSIS — M05.79 RHEUMATOID ARTHRITIS INVOLVING MULTIPLE SITES WITH POSITIVE RHEUMATOID FACTOR (HCC): ICD-10-CM

## 2020-07-31 DIAGNOSIS — D50.9 IRON DEFICIENCY ANEMIA, UNSPECIFIED IRON DEFICIENCY ANEMIA TYPE: ICD-10-CM

## 2020-07-31 DIAGNOSIS — K76.0 FATTY LIVER: ICD-10-CM

## 2020-07-31 DIAGNOSIS — E87.6 HYPOPOTASSEMIA: ICD-10-CM

## 2020-07-31 DIAGNOSIS — G62.9 NEUROPATHY: ICD-10-CM

## 2020-07-31 PROCEDURE — 99490 CHRNC CARE MGMT STAFF 1ST 20: CPT

## 2020-07-31 NOTE — PROGRESS NOTES
7/31/2020  Spoke to Wily for CCM.       Updates to patient care team/ comments: Dr. Lizbeth Rosado GI added  Patient reported changes in medications: None  Med Adherence  Comment: Taking as directed     Health Maintenance: Reviewed  Pap Smear,1 Year due on 1 reported progress toward goal: does some stretching knows she needs to do more      • Update to previous barriers: n/a    • Patient Reported New Barriers And Concerns: None                   - Plan for overcoming all barriers: n/a            Patient agrees

## 2020-08-11 RX ORDER — POTASSIUM CHLORIDE 20 MEQ/1
TABLET, EXTENDED RELEASE ORAL
Qty: 90 TABLET | Refills: 0 | Status: SHIPPED | OUTPATIENT
Start: 2020-08-11 | End: 2020-11-06

## 2020-08-11 NOTE — TELEPHONE ENCOUNTER
Last Refill:    KLOR-CON M20 20 MEQ Oral Tab CR 90 tablet 0 5/13/2020     LOV - 5/21/20 Virtual Appt for RA & Fatigue   12/27/19 Well Adult  NOV - Not Scheduled    [facility] [id number] [facility] [employer] - [address]     Future appt:    Last Appointment with provider:   Visit

## 2020-08-26 ENCOUNTER — PATIENT OUTREACH (OUTPATIENT)
Dept: CASE MANAGEMENT | Age: 65
End: 2020-08-26

## 2020-09-10 ENCOUNTER — TELEPHONE (OUTPATIENT)
Dept: FAMILY MEDICINE CLINIC | Facility: CLINIC | Age: 65
End: 2020-09-10

## 2020-09-10 ENCOUNTER — PATIENT OUTREACH (OUTPATIENT)
Dept: CASE MANAGEMENT | Age: 65
End: 2020-09-10

## 2020-09-10 DIAGNOSIS — K76.0 FATTY LIVER: ICD-10-CM

## 2020-09-10 DIAGNOSIS — M15.9 PRIMARY OSTEOARTHRITIS INVOLVING MULTIPLE JOINTS: ICD-10-CM

## 2020-09-10 DIAGNOSIS — M05.79 RHEUMATOID ARTHRITIS INVOLVING MULTIPLE SITES WITH POSITIVE RHEUMATOID FACTOR (HCC): ICD-10-CM

## 2020-09-10 DIAGNOSIS — G62.9 NEUROPATHY: ICD-10-CM

## 2020-09-10 DIAGNOSIS — E83.42 HYPOMAGNESEMIA: ICD-10-CM

## 2020-09-10 DIAGNOSIS — K90.0 CELIAC DISEASE: ICD-10-CM

## 2020-09-10 DIAGNOSIS — E87.6 HYPOPOTASSEMIA: ICD-10-CM

## 2020-09-10 DIAGNOSIS — I10 BENIGN ESSENTIAL HYPERTENSION: ICD-10-CM

## 2020-09-10 DIAGNOSIS — D50.9 IRON DEFICIENCY ANEMIA, UNSPECIFIED IRON DEFICIENCY ANEMIA TYPE: ICD-10-CM

## 2020-09-10 NOTE — PROGRESS NOTES
9/10/2020  Spoke to Wily for CCM.       Updates to patient care team/ comments: None  Patient reported changes in medications: None  Med Adherence  Comment: Taking as directed     Health Maintenance: Reviewed  Pap Smear,1 Year due on 11/22/2018 - message t And Concerns: None                   - Plan for overcoming all barriers: n/a            Patient agrees to goal action plan.   Self-Management Abilities (patient reported)             1= least confident in achieving goal, 5= very confident               - co

## 2020-09-10 NOTE — TELEPHONE ENCOUNTER
While speaking to pt for monthly CCM outreach pt noted to be past due for pap smear. Can that be address when she comes for her AWV in December? Also pt will do mammogram in November, please place order for pt.      Please reply to pt via [employer] message

## 2020-09-19 RX ORDER — OXYBUTYNIN CHLORIDE 5 MG/1
TABLET, EXTENDED RELEASE ORAL
Qty: 180 TABLET | Refills: 0 | Status: SHIPPED | OUTPATIENT
Start: 2020-09-19 | End: 2020-12-18

## 2020-09-28 RX ORDER — TRIAMTERENE AND HYDROCHLOROTHIAZIDE 37.5; 25 MG/1; MG/1
CAPSULE ORAL
Qty: 90 CAPSULE | Refills: 0 | Status: SHIPPED | OUTPATIENT
Start: 2020-09-28 | End: 2020-12-29

## 2020-09-28 RX ORDER — ESCITALOPRAM OXALATE 10 MG/1
TABLET ORAL
Qty: 90 TABLET | Refills: 0 | Status: SHIPPED | OUTPATIENT
Start: 2020-09-28 | End: 2020-12-29

## 2020-09-28 NOTE — TELEPHONE ENCOUNTER
Future appt:     Your appointments     Date & Time Appointment Department [facility])    Dec 28, 2020 11:00 AM CST Medicare Annual Well Visit with Sunday Yanez MD [address] [facility], [place] ([facility])            Ed

## 2020-09-30 PROCEDURE — 99490 CHRNC CARE MGMT STAFF 1ST 20: CPT

## 2020-10-08 ENCOUNTER — PATIENT OUTREACH (OUTPATIENT)
Dept: CASE MANAGEMENT | Age: 65
End: 2020-10-08

## 2020-10-20 ENCOUNTER — PATIENT OUTREACH (OUTPATIENT)
Dept: CASE MANAGEMENT | Age: 65
End: 2020-10-20

## 2020-10-20 NOTE — PROGRESS NOTES
Called patient for monthly CCM outreach, pt spouse stated she is sleeping right now. I will call pt back at another time.     Chart review - 2 min  Time with patient - 1 min  Total time - 7 min

## 2020-10-23 ENCOUNTER — PATIENT OUTREACH (OUTPATIENT)
Dept: CASE MANAGEMENT | Age: 65
End: 2020-10-23

## 2020-10-23 NOTE — PROGRESS NOTES
Called patient for monthly CCM outreach, left message to call back.       Chart review - 1 min  Time with patient - 0 min  Total time - 8 min

## 2020-11-06 RX ORDER — POTASSIUM CHLORIDE 20 MEQ/1
TABLET, EXTENDED RELEASE ORAL
Qty: 90 TABLET | Refills: 0 | Status: SHIPPED | OUTPATIENT
Start: 2020-11-06 | End: 2021-01-29

## 2020-11-09 ENCOUNTER — PATIENT OUTREACH (OUTPATIENT)
Dept: CASE MANAGEMENT | Age: 65
End: 2020-11-09

## 2020-11-10 ENCOUNTER — TELEPHONE (OUTPATIENT)
Dept: FAMILY MEDICINE CLINIC | Facility: CLINIC | Age: 65
End: 2020-11-10

## 2020-11-10 ENCOUNTER — PATIENT OUTREACH (OUTPATIENT)
Dept: CASE MANAGEMENT | Age: 65
End: 2020-11-10

## 2020-11-10 DIAGNOSIS — E87.6 HYPOPOTASSEMIA: ICD-10-CM

## 2020-11-10 DIAGNOSIS — M05.79 RHEUMATOID ARTHRITIS INVOLVING MULTIPLE SITES WITH POSITIVE RHEUMATOID FACTOR (HCC): ICD-10-CM

## 2020-11-10 DIAGNOSIS — R92.8 ABNORMALITY OF RIGHT BREAST ON SCREENING MAMMOGRAM: Primary | ICD-10-CM

## 2020-11-10 DIAGNOSIS — K76.0 FATTY LIVER: ICD-10-CM

## 2020-11-10 DIAGNOSIS — I10 BENIGN ESSENTIAL HYPERTENSION: ICD-10-CM

## 2020-11-10 DIAGNOSIS — G62.9 NEUROPATHY: ICD-10-CM

## 2020-11-10 DIAGNOSIS — K90.0 CELIAC DISEASE: ICD-10-CM

## 2020-11-10 DIAGNOSIS — E83.42 HYPOMAGNESEMIA: ICD-10-CM

## 2020-11-10 DIAGNOSIS — M15.9 PRIMARY OSTEOARTHRITIS INVOLVING MULTIPLE JOINTS: ICD-10-CM

## 2020-11-10 DIAGNOSIS — D50.9 IRON DEFICIENCY ANEMIA, UNSPECIFIED IRON DEFICIENCY ANEMIA TYPE: ICD-10-CM

## 2020-11-10 NOTE — TELEPHONE ENCOUNTER
Bilateral Screening Mammogram completed at [facility] on 11/9/20-  Impression: Incomplete  \"the multi levrl focal asymmetric densities in the right breast are indeterminate. Compression views as well as a possible ultrasound are recommended.

## 2020-11-10 NOTE — PROGRESS NOTES
11/10/2020  Spoke to Wily for CCM.       Updates to patient care team/ comments: None  Patient reported changes in medications: None  Med Adherence  Comment: Taking as directed     Health Maintenance: Reviewed  Pap Smear,1 Year due on 11/22/2018  Mammogram Patient agrees to goal action plan.   Self-Management Abilities (patient reported)             1= least confident in achieving goal, 5= very confident               - confidence: : 4        Care Manager Follow Up: 1 month  Reason For Follow Up: review

## 2020-11-19 DIAGNOSIS — R92.8 ABNORMAL MAMMOGRAM: Primary | ICD-10-CM

## 2020-11-24 ENCOUNTER — TELEPHONE (OUTPATIENT)
Dept: FAMILY MEDICINE CLINIC | Facility: CLINIC | Age: 65
End: 2020-11-24

## 2020-11-30 PROCEDURE — 99490 CHRNC CARE MGMT STAFF 1ST 20: CPT

## 2020-11-30 RX ORDER — OMEPRAZOLE 40 MG/1
CAPSULE, DELAYED RELEASE ORAL
Qty: 90 CAPSULE | Refills: 0 | Status: SHIPPED | OUTPATIENT
Start: 2020-11-30 | End: 2021-01-29

## 2020-11-30 NOTE — TELEPHONE ENCOUNTER
Future appt:     Your appointments     Date & Time Appointment Department [facility])    Dec 28, 2020 11:00 AM CST Medicare Annual Well Visit with Nkechi Chris MD [address] [facility], [place] ([facility])            Ed

## 2020-12-10 ENCOUNTER — PATIENT OUTREACH (OUTPATIENT)
Dept: CASE MANAGEMENT | Age: 65
End: 2020-12-10

## 2020-12-10 DIAGNOSIS — G62.9 NEUROPATHY: ICD-10-CM

## 2020-12-10 DIAGNOSIS — K90.0 CELIAC DISEASE: ICD-10-CM

## 2020-12-10 DIAGNOSIS — D50.9 IRON DEFICIENCY ANEMIA, UNSPECIFIED IRON DEFICIENCY ANEMIA TYPE: ICD-10-CM

## 2020-12-10 DIAGNOSIS — M15.9 PRIMARY OSTEOARTHRITIS INVOLVING MULTIPLE JOINTS: ICD-10-CM

## 2020-12-10 DIAGNOSIS — I10 BENIGN ESSENTIAL HYPERTENSION: ICD-10-CM

## 2020-12-10 DIAGNOSIS — M05.79 RHEUMATOID ARTHRITIS INVOLVING MULTIPLE SITES WITH POSITIVE RHEUMATOID FACTOR (HCC): ICD-10-CM

## 2020-12-10 NOTE — PROGRESS NOTES
12/10/2020  Spoke to Rowdy Gaytan for CCM.       Updates to patient care team/ comments: None  Patient reported changes in medications: None  Med Adherence  Comment: Taking as directed     Health Maintenance: Up to date  Annual Depression Screen due on 12/27/2020 1= least confident in achieving goal, 5= very confident               - confidence: : 4      Care Manager Follow Up: 1 month  Reason For Follow Up: review progress and or barriers towards patients goals.      Care managers interventions: Listened to pt expr

## 2020-12-17 ENCOUNTER — PATIENT MESSAGE (OUTPATIENT)
Dept: FAMILY MEDICINE CLINIC | Facility: CLINIC | Age: 65
End: 2020-12-17

## 2020-12-17 DIAGNOSIS — R73.09 ABNORMAL GLUCOSE: ICD-10-CM

## 2020-12-17 DIAGNOSIS — D50.9 IRON DEFICIENCY ANEMIA, UNSPECIFIED IRON DEFICIENCY ANEMIA TYPE: ICD-10-CM

## 2020-12-17 DIAGNOSIS — E83.42 HYPOMAGNESEMIA: ICD-10-CM

## 2020-12-17 DIAGNOSIS — E55.9 VITAMIN D DEFICIENCY: ICD-10-CM

## 2020-12-17 DIAGNOSIS — Z00.00 ANNUAL PHYSICAL EXAM: ICD-10-CM

## 2020-12-17 DIAGNOSIS — I10 BENIGN ESSENTIAL HYPERTENSION: Primary | ICD-10-CM

## 2020-12-17 NOTE — TELEPHONE ENCOUNTER
From: Manish Box  To: Johnson Soares MD  Sent: 12/17/2020 10:53 AM CST  Subject: Non-Urgent Medical Question    I have an annual visit with Dr. Adriana Greenberg on 12/28/2020.  I would like to get lab work done before then so we have the results at the vi

## 2020-12-18 RX ORDER — OXYBUTYNIN CHLORIDE 5 MG/1
TABLET, EXTENDED RELEASE ORAL
Qty: 180 TABLET | Refills: 0 | Status: SHIPPED | OUTPATIENT
Start: 2020-12-18 | End: 2021-01-29

## 2020-12-18 NOTE — TELEPHONE ENCOUNTER
Pt scheduled for lab appt on Tuesday. Covid Q's reviewed with pt. Pt states she has chronic joint pain and fatigue, otherwise screening is negative. Pt instructed to keep appts.   Future Appointments   Date Time Provider Syd Koch   12/22/2

## 2020-12-18 NOTE — TELEPHONE ENCOUNTER
Future appt:     Your appointments     Date & Time Appointment Department [facility])    Dec 28, 2020 11:00 AM CST Medicare Annual Well Visit with Pasquale Schlatter, MD [address] [place] [address], [place] ([facility]            Ed

## 2020-12-22 ENCOUNTER — LABORATORY ENCOUNTER (OUTPATIENT)
Dept: LAB | Age: 65
End: 2020-12-22
Attending: FAMILY MEDICINE
Payer: MEDICARE

## 2020-12-22 DIAGNOSIS — R79.89 ELEVATED LIVER FUNCTION TESTS: ICD-10-CM

## 2020-12-22 DIAGNOSIS — R73.09 ABNORMAL GLUCOSE: ICD-10-CM

## 2020-12-22 DIAGNOSIS — Z00.00 ANNUAL PHYSICAL EXAM: ICD-10-CM

## 2020-12-22 DIAGNOSIS — D50.9 IRON DEFICIENCY ANEMIA, UNSPECIFIED IRON DEFICIENCY ANEMIA TYPE: ICD-10-CM

## 2020-12-22 DIAGNOSIS — E83.42 HYPOMAGNESEMIA: ICD-10-CM

## 2020-12-22 DIAGNOSIS — E55.9 VITAMIN D DEFICIENCY: ICD-10-CM

## 2020-12-22 DIAGNOSIS — I10 BENIGN ESSENTIAL HYPERTENSION: ICD-10-CM

## 2020-12-22 PROCEDURE — 80061 LIPID PANEL: CPT

## 2020-12-22 PROCEDURE — 82728 ASSAY OF FERRITIN: CPT

## 2020-12-22 PROCEDURE — 83036 HEMOGLOBIN GLYCOSYLATED A1C: CPT

## 2020-12-22 PROCEDURE — 83735 ASSAY OF MAGNESIUM: CPT

## 2020-12-22 PROCEDURE — 82306 VITAMIN D 25 HYDROXY: CPT

## 2020-12-22 PROCEDURE — 36415 COLL VENOUS BLD VENIPUNCTURE: CPT

## 2020-12-22 PROCEDURE — 85025 COMPLETE CBC W/AUTO DIFF WBC: CPT

## 2020-12-22 PROCEDURE — 81001 URINALYSIS AUTO W/SCOPE: CPT

## 2020-12-22 PROCEDURE — 83550 IRON BINDING TEST: CPT

## 2020-12-22 PROCEDURE — 83540 ASSAY OF IRON: CPT

## 2020-12-22 PROCEDURE — 80053 COMPREHEN METABOLIC PANEL: CPT

## 2020-12-22 PROCEDURE — 84443 ASSAY THYROID STIM HORMONE: CPT

## 2020-12-28 ENCOUNTER — OFFICE VISIT (OUTPATIENT)
Dept: FAMILY MEDICINE CLINIC | Facility: CLINIC | Age: 65
End: 2020-12-28
Payer: MEDICARE

## 2020-12-28 VITALS
TEMPERATURE: 98 F | HEART RATE: 76 BPM | RESPIRATION RATE: 16 BRPM | OXYGEN SATURATION: 96 % | SYSTOLIC BLOOD PRESSURE: 126 MMHG | DIASTOLIC BLOOD PRESSURE: 78 MMHG | BODY MASS INDEX: 44 KG/M2 | WEIGHT: 244.19 LBS

## 2020-12-28 DIAGNOSIS — G62.9 NEUROPATHY: ICD-10-CM

## 2020-12-28 DIAGNOSIS — E87.6 HYPOPOTASSEMIA: ICD-10-CM

## 2020-12-28 DIAGNOSIS — Z13.31 DEPRESSION SCREENING: ICD-10-CM

## 2020-12-28 DIAGNOSIS — Z00.00 ENCOUNTER FOR ANNUAL HEALTH EXAMINATION: Primary | ICD-10-CM

## 2020-12-28 DIAGNOSIS — M05.79 RHEUMATOID ARTHRITIS INVOLVING MULTIPLE SITES WITH POSITIVE RHEUMATOID FACTOR (HCC): ICD-10-CM

## 2020-12-28 DIAGNOSIS — Z86.711 HISTORY OF PULMONARY EMBOLUS (PE): ICD-10-CM

## 2020-12-28 DIAGNOSIS — Z22.7 TB LUNG, LATENT: ICD-10-CM

## 2020-12-28 DIAGNOSIS — I10 BENIGN ESSENTIAL HYPERTENSION: ICD-10-CM

## 2020-12-28 DIAGNOSIS — E83.42 HYPOMAGNESEMIA: ICD-10-CM

## 2020-12-28 DIAGNOSIS — I82.432 ACUTE DEEP VEIN THROMBOSIS (DVT) OF POPLITEAL VEIN OF LEFT LOWER EXTREMITY (HCC): ICD-10-CM

## 2020-12-28 PROBLEM — I26.94 MULTIPLE SUBSEGMENTAL PULMONARY EMBOLI WITHOUT ACUTE COR PULMONALE (HCC): Status: RESOLVED | Noted: 2018-03-21 | Resolved: 2020-12-28

## 2020-12-28 PROBLEM — I26.94 MULTIPLE SUBSEGMENTAL PULMONARY EMBOLI WITHOUT ACUTE COR PULMONALE (HCC): Status: ACTIVE | Noted: 2018-03-21

## 2020-12-28 PROBLEM — R00.1 SINUS BRADYCARDIA: Status: RESOLVED | Noted: 2018-12-14 | Resolved: 2020-12-28

## 2020-12-28 PROCEDURE — G0439 PPPS, SUBSEQ VISIT: HCPCS | Performed by: FAMILY MEDICINE

## 2020-12-28 PROCEDURE — G0444 DEPRESSION SCREEN ANNUAL: HCPCS | Performed by: FAMILY MEDICINE

## 2020-12-28 NOTE — PROGRESS NOTES
CC: Annual Physical Exam    HPI:   Polly Fernandez is a 77year old female who presents for a complete physical exam.      Pt brandon increased weight  Pt not watching diet and less active    Urinalysis normal   Hemoglobin A1c improved to 5.8.  Vitamin D lev 2.8 - 4.4 g/dL    A/G Ratio 1.1 1.0 - 2.0    FASTING Yes    LIPID PANEL   Result Value Ref Range    Cholesterol, Total 163 <200 mg/dL    HDL Cholesterol 48 40 - 59 mg/dL    Triglycerides 162 (H) 30 - 149 mg/dL    LDL Cholesterol 83 <100 mg/dL    VLDL 32 (H x10(3) uL    Monocyte Absolute 0.64 0.10 - 1.00 x10(3) uL    Eosinophil Absolute 0.15 0.00 - 0.70 x10(3) uL    Basophil Absolute 0.04 0.00 - 0.20 x10(3) uL    Immature Granulocyte Absolute 0.01 0.00 - 1.00 x10(3) uL    Neutrophil % 66.2 %    Lymphocyte % 2 diarrhea  Phenylbutazone              Comment:Other reaction(s): sores in mouth  Sulfa Antibiotics       RASH  Hydroxychloroquine          Comment:Other reaction(s):  Other (See Comments)             Vision changes  Other                       Comment:Other Substance and Sexual Activity      Alcohol use: No        Alcohol/week: 0.0 standard drinks      Drug use: No    Other Topics      Concerns:      Exercise: none.   Diet: doesn't watch       General Health     In the past six months, have you lost more than (PHQ-2/PHQ-9): Over the LAST 2 WEEKS   Little interest or pleasure in doing things (over the last two weeks)?: Not at all    Feeling down, depressed, or hopeless (over the last two weeks)?: Not at all    PHQ-2 SCORE: 0        Advance Directives     Do you response, history of asthma, sneezing, hives, eczema or rhinitis.     EXAM:   /78 (BP Location: Left arm, Patient Position: Sitting, Cuff Size: large)   Pulse 76   Temp 97.8 °F (36.6 °C) (Temporal)   Resp 16   Wt 244 lb 3.2 oz (110.8 kg)   SpO2 96% protocols reviewed. 2. Depression screening  Negative screen today  - DEPRESSION SCREEN ANNUAL    3. Benign essential hypertension  Blood pressure stable    4.  Acute deep vein thrombosis (DVT) of popliteal vein of left lower extremity (HCC)  Historical Procedure   Diabetes Screening      HbgA1C    At Least  Annually for Diabetics HgbA1C (%)   Date Value   12/22/2020 5.8 (H)    No flowsheet data found.     Fasting Blood Sugar (FSB)   Patient must be diagnosed with one of the following:   • Hypertension   • Covered Annually Occult Blood Result (no units)   Date Value   03/04/2018 Negative for Occult Blood   03/04/2018 Negative for Occult Blood   03/04/2018 Negative for Occult Blood    No flowsheet data found.      Barium Enema-   uncomfortable but covered  C visit on 12/14/18   • PNEUMOCOCCAL IMM (PNEUMOVAX)    Please get once after your 65th birthday    Hepatitis B for Moderate/High Risk       No orders found for this or any previous visit.  Medium/high risk factors:   End-stage renal disease   Hemophiliacs wh Imaging & Consults:  None    The patient is asked to return in *6 months sooner if concerns**. This note was created utilizing Dragon speech recognition software.  Please excuse any grammatical errors.  Call my office if you have any questions samantha

## 2020-12-28 NOTE — PATIENT INSTRUCTIONS
rec pt monitor diet and activity closer for weight loss    Ok to stop iron, continue other suppliments    Monitor tremor    Encourage activity as tolerates    rec neck range of motion stretches    rec pt to wear compression stockings today    Recheck 6 mon ages 73-68) IPPE only No results found for this or any previous visit.  Limited to patients who meet one of the following criteria:   • Men who are 73-68 years old and have smoked more than 100 cigarettes in their lifetime   • Anyone with a family history results found for: CHLAMYDIA No flowsheet data found.     Screening Mammogram      Mammogram    Recommend Annually to at least age 76, and as needed after 76 Mammogram due on 05/09/2021 Please get this Mammogram regularly   Immunizations      Influenza  Cov anyone to review and print using their home computer and printer. (the forms are also available in [address])  [email or web address]  This link also has information from the [address] [place] [address] regarding Advance Directives.

## 2020-12-29 RX ORDER — TRIAMTERENE AND HYDROCHLOROTHIAZIDE 37.5; 25 MG/1; MG/1
CAPSULE ORAL
Qty: 90 CAPSULE | Refills: 3 | Status: SHIPPED | OUTPATIENT
Start: 2020-12-29 | End: 2021-12-20

## 2020-12-29 RX ORDER — ESCITALOPRAM OXALATE 10 MG/1
TABLET ORAL
Qty: 90 TABLET | Refills: 3 | Status: SHIPPED | OUTPATIENT
Start: 2020-12-29 | End: 2021-12-20

## 2020-12-29 NOTE — TELEPHONE ENCOUNTER
Future appt:    Last Appointment with provider:   12/28/2020  Last appointment at [facility]:  12/28/2020- [address]- pt asked to return in six months    Dyazide and Escitalopram refilled on 9/28/20 for #90 with one refill    Cholesterol, Total (mg/dL)   Date V

## 2020-12-31 PROCEDURE — 99490 CHRNC CARE MGMT STAFF 1ST 20: CPT

## 2021-01-06 NOTE — PATIENT INSTRUCTIONS
Pt to do trial maxide every other day. Monitor home weight and BP- report status 2 weeks    Labs- chemistry, UA and culture    Need updated labs , report from DR. Jennifer Toure
independent

## 2021-01-18 ENCOUNTER — PATIENT OUTREACH (OUTPATIENT)
Dept: CASE MANAGEMENT | Age: 66
End: 2021-01-18

## 2021-01-18 DIAGNOSIS — I10 BENIGN ESSENTIAL HYPERTENSION: ICD-10-CM

## 2021-01-18 DIAGNOSIS — M15.9 PRIMARY OSTEOARTHRITIS INVOLVING MULTIPLE JOINTS: ICD-10-CM

## 2021-01-18 DIAGNOSIS — K90.0 CELIAC DISEASE: ICD-10-CM

## 2021-01-18 DIAGNOSIS — D50.9 IRON DEFICIENCY ANEMIA, UNSPECIFIED IRON DEFICIENCY ANEMIA TYPE: ICD-10-CM

## 2021-01-18 DIAGNOSIS — E83.42 HYPOMAGNESEMIA: ICD-10-CM

## 2021-01-18 DIAGNOSIS — M05.79 RHEUMATOID ARTHRITIS INVOLVING MULTIPLE SITES WITH POSITIVE RHEUMATOID FACTOR (HCC): ICD-10-CM

## 2021-01-18 DIAGNOSIS — G62.9 NEUROPATHY: ICD-10-CM

## 2021-01-18 DIAGNOSIS — E87.6 HYPOPOTASSEMIA: ICD-10-CM

## 2021-01-18 NOTE — PROGRESS NOTES
1/18/2021  Spoke to Wily for CCM.       Updates to patient care team/ comments: None  Patient reported changes in medications: None  Med Adherence  Comment: Taking as directed     Health Maintenance: Up to date  Mammogram due on 05/09/2021  Fall Risk Fiona toward goal: Stretches daily and has used the hand pedal more for exercise. Needs to work on eating habits to cut back on sweets.       • Update to previous barriers: n/a    • Patient Reported New Barriers And Concerns: None                   - Plan for ove

## 2021-01-19 ENCOUNTER — TELEMEDICINE (OUTPATIENT)
Dept: FAMILY MEDICINE CLINIC | Facility: CLINIC | Age: 66
End: 2021-01-19
Payer: MEDICARE

## 2021-01-19 ENCOUNTER — TELEPHONE (OUTPATIENT)
Dept: FAMILY MEDICINE CLINIC | Facility: CLINIC | Age: 66
End: 2021-01-19

## 2021-01-19 VITALS — HEART RATE: 79 BPM | TEMPERATURE: 98 F | DIASTOLIC BLOOD PRESSURE: 78 MMHG | SYSTOLIC BLOOD PRESSURE: 121 MMHG

## 2021-01-19 DIAGNOSIS — R05.9 COUGH: ICD-10-CM

## 2021-01-19 DIAGNOSIS — R50.9 FEVER, UNSPECIFIED FEVER CAUSE: Primary | ICD-10-CM

## 2021-01-19 DIAGNOSIS — R52 BODY ACHES: ICD-10-CM

## 2021-01-19 PROCEDURE — 99213 OFFICE O/P EST LOW 20 MIN: CPT | Performed by: NURSE PRACTITIONER

## 2021-01-19 NOTE — TELEPHONE ENCOUNTER
Pt c/o flu like s/s. Pt states she and her huband started not feeling well on Saturday. Pt c/o headache, fever (99.6-100.4) chills, headaches, body aches more so than usual.  Pt states her  is having a covid test tomorrow.   Pt asked to have testin

## 2021-01-19 NOTE — PATIENT INSTRUCTIONS
Continue to monitor symptoms. May continue to take Tylenol for fever, body ache and headache you are experiencing. Continue to push lots of fluids. We do asked that you stay home while waiting for test results to come back.   Please report to the [facility], especially when new infections are rapidly rising. Your local public health authorities make the final decisions about how long quarantine should last, based on local conditions and needs.  Follow the recommendations of your local public hea people in your home. Also, you should use a separate bathroom, if available. If you need to be around other people in or outside of the home, wear a facemask.    9. Avoid sharing personal items with other people in your household, like dishes, towels, and b symptoms started, and at least 24 hours after your fever is gone and symptoms are getting better, whichever is longer. Patients with pending COVID-19 test results should follow all care and home isolation instructions.   Your test results will be called to Nurse Triage Line at [phone].     Additional Information      You can also get more information at the following websites:   Centers for Disease Control & Prevention (CDC)  What to do if you are sick with coronavirus disease 2019, [email or web address]

## 2021-01-19 NOTE — PROGRESS NOTES
HPI:    Patient ID: Melanie Asencio is a 74year old female. Melanie Asencio  verbally consents to a [employer] on 1/19/2021.     Patient understands and accepts financial responsibility for any deductible, co-insurance and/or co-pays associa Oral Capsule Delayed Release TAKE 1 CAPSULE BY MOUTH EVERY DAY 90 capsule 0   • KLOR-CON M20 20 MEQ Oral Tab CR TAKE 1 TABLET BY MOUTH EVERY DAY 90 tablet 0   • METOPROLOL SUCCINATE ER 25 MG Oral Tablet 24 Hr TAKE 1 TABLET BY MOUTH EVERY DAY 90 tablet 3 take Tylenol as needed for headache body ache and fever. She is to report to the emergency room if she has any respiratory distress. At home care instructions sent via Joie Erickson. She will follow up with any questions or concerns.  Patient agrees to plan an

## 2021-01-22 ENCOUNTER — TELEPHONE (OUTPATIENT)
Dept: FAMILY MEDICINE CLINIC | Facility: CLINIC | Age: 66
End: 2021-01-22

## 2021-01-22 RX ORDER — AZITHROMYCIN 250 MG/1
TABLET, FILM COATED ORAL
Qty: 6 TABLET | Refills: 0 | Status: SHIPPED | OUTPATIENT
Start: 2021-01-22 | End: 2021-01-27

## 2021-01-22 NOTE — TELEPHONE ENCOUNTER
Spoke with patient Betteluzmariahanna Yanes regarding her symptoms. Patient states overall she continues to feel unwell. States she has bilateral ear congestion and some facial sinus pressure in the cheek area, states both sides are equally painful.   States she continues

## 2021-01-22 NOTE — TELEPHONE ENCOUNTER
Pt had virtual appt on 1/19/21. Pt states she had a negative covid on 1/20. Pt is feeling worse. Pt has bilateral ear pressure. Pt c/o headache. And neck ache. Pt states she couldn't sleep last night.    Pt states she has pain in the back of her head

## 2021-01-29 RX ORDER — OMEPRAZOLE 40 MG/1
CAPSULE, DELAYED RELEASE ORAL
Qty: 90 CAPSULE | Refills: 1 | Status: SHIPPED | OUTPATIENT
Start: 2021-01-29 | End: 2021-08-18

## 2021-01-29 RX ORDER — METOPROLOL SUCCINATE 25 MG/1
TABLET, EXTENDED RELEASE ORAL
Qty: 90 TABLET | Refills: 1 | Status: SHIPPED | OUTPATIENT
Start: 2021-01-29 | End: 2021-09-17

## 2021-01-29 RX ORDER — OXYBUTYNIN CHLORIDE 5 MG/1
TABLET, EXTENDED RELEASE ORAL
Qty: 180 TABLET | Refills: 1 | Status: SHIPPED | OUTPATIENT
Start: 2021-01-29 | End: 2021-03-10

## 2021-01-29 RX ORDER — POTASSIUM CHLORIDE 20 MEQ/1
20 TABLET, EXTENDED RELEASE ORAL DAILY
Qty: 90 TABLET | Refills: 3 | Status: SHIPPED | OUTPATIENT
Start: 2021-01-29 | End: 2022-01-21

## 2021-01-29 NOTE — TELEPHONE ENCOUNTER
Future appt:    Last Appointment with provider:   12/28/2020; Recheck 6 months    Last appointment at [facility]:  12/28/2020  Cholesterol, Total (mg/dL)   Date Value   12/22/2020 163     Total Cholesterol (mg/dL)   Date Value   03/09/2019 127     HDL Cholesterol (mg/dL)   Date Value   12/22/2020 48   03/09/2019 51     LDL Cholesterol (mg/dL)   Date Value   12/22/2020 83   03/09/2019 52     Triglycerides (mg/dL)   Date Value   12/22/2020 162 (H)   03/09/2019 119     Lab Results   Component Value Date     12/22/2020    A1C 5.8 (H) 12/22/2020     Lab Results   Component Value Date    TSH 1.510 12/22/2020     Last RF:  11/6/2020    No follow-ups on file.

## 2021-01-29 NOTE — TELEPHONE ENCOUNTER
Future appt:    Last Appointment with provider:   12/28/2020; Recheck 6 months    Last appointment at [facility]:  12/28/2020  Cholesterol, Total (mg/dL)   Date Value   12/22/2020 163     Total Cholesterol (mg/dL)   Date Value   03/09/2019 127     HDL Ch

## 2021-01-30 ENCOUNTER — PATIENT MESSAGE (OUTPATIENT)
Dept: FAMILY MEDICINE CLINIC | Facility: CLINIC | Age: 66
End: 2021-01-30

## 2021-01-30 NOTE — TELEPHONE ENCOUNTER
From: Mary Peck  To: Arvind Glover MD  Sent: 1/30/2021 10:57 AM CST  Subject: Prescription Question    The pharmacist at [facility] wanted me to check with you about my potassium prescription.  When they went to fill it, a message came back about

## 2021-01-30 NOTE — TELEPHONE ENCOUNTER
Pharmacy informed. Pharmacy wanted to verify that pt is up to date on labs. Pharmacy informed- pt had labs checked in Dec.    Pt informed.

## 2021-01-31 PROCEDURE — 99490 CHRNC CARE MGMT STAFF 1ST 20: CPT

## 2021-02-17 ENCOUNTER — PATIENT OUTREACH (OUTPATIENT)
Dept: CASE MANAGEMENT | Age: 66
End: 2021-02-17

## 2021-02-17 NOTE — PROGRESS NOTES
Called patient for monthly CCM outreach, pt  stated she is sleeping. Advised will call pt back tomorrow.       Chart review - 4 min  Time with patient - 1 min  Total time -  5 min

## 2021-02-26 ENCOUNTER — PATIENT OUTREACH (OUTPATIENT)
Dept: CASE MANAGEMENT | Age: 66
End: 2021-02-26

## 2021-03-04 DIAGNOSIS — Z23 NEED FOR VACCINATION: ICD-10-CM

## 2021-03-10 ENCOUNTER — TELEPHONE (OUTPATIENT)
Dept: FAMILY MEDICINE CLINIC | Facility: CLINIC | Age: 66
End: 2021-03-10

## 2021-03-10 NOTE — TELEPHONE ENCOUNTER
Received fax from [facility] re: Oxybutynin. Fax states:  \"this drug is either not included on our list of covered drugs or it's included on the formulary but subject to certain limits.  [facility] is required to provide you with a temporary supply

## 2021-03-11 NOTE — TELEPHONE ENCOUNTER
Pt informed. Pt states she was going to talk to CR about weaning down on the Oxybutynin to one tablet once daily. Pt states BID dosing doesn't seem to make a big difference.     Pt also wonders if she can all together just hold off on medication at this

## 2021-03-15 ENCOUNTER — PATIENT OUTREACH (OUTPATIENT)
Dept: CASE MANAGEMENT | Age: 66
End: 2021-03-15

## 2021-03-15 DIAGNOSIS — G62.9 NEUROPATHY: ICD-10-CM

## 2021-03-15 DIAGNOSIS — E83.42 HYPOMAGNESEMIA: ICD-10-CM

## 2021-03-15 DIAGNOSIS — M05.79 RHEUMATOID ARTHRITIS INVOLVING MULTIPLE SITES WITH POSITIVE RHEUMATOID FACTOR (HCC): ICD-10-CM

## 2021-03-15 DIAGNOSIS — M15.9 PRIMARY OSTEOARTHRITIS INVOLVING MULTIPLE JOINTS: ICD-10-CM

## 2021-03-15 DIAGNOSIS — K90.0 CELIAC DISEASE: ICD-10-CM

## 2021-03-15 DIAGNOSIS — M85.832 OSTEOPENIA OF LEFT FOREARM: ICD-10-CM

## 2021-03-15 DIAGNOSIS — I10 BENIGN ESSENTIAL HYPERTENSION: ICD-10-CM

## 2021-03-15 DIAGNOSIS — E87.6 HYPOPOTASSEMIA: ICD-10-CM

## 2021-03-15 DIAGNOSIS — D50.9 IRON DEFICIENCY ANEMIA, UNSPECIFIED IRON DEFICIENCY ANEMIA TYPE: ICD-10-CM

## 2021-03-15 NOTE — PROGRESS NOTES
3/15/2021  Spoke to Wily for CCM.       Updates to patient care team/ comments: None  Patient reported changes in medications: Oxybutynin discontinued  Med Adherence  Comment: Taking as directed     Health Maintenance: Up to date  Mammogram due on 05/09/20 reported)             1= least confident in achieving goal, 5= very confident               - confidence: : 4    New Goal (if previous met)  • My goal for next month is: (Patient Stated)           - What: Wt loss           - When: Now           - How: Diet

## 2021-03-16 NOTE — TELEPHONE ENCOUNTER
Received forms via fax this afternoon. Forms given to Dr. Kemal Bello. Please advise. Burow's Graft Text: The defect edges were debeveled with a #15 scalpel blade.  Given the location of the defect, shape of the defect, the proximity to free margins and the presence of a standing cone deformity a Burow's skin graft was deemed most appropriate. The standing cone was removed and this tissue was then trimmed to the shape of the primary defect. The adipose tissue was also removed until only dermis and epidermis were left.  The skin margins of the secondary defect were undermined to an appropriate distance in all directions utilizing iris scissors.  The secondary defect was closed with interrupted buried subcutaneous sutures.  The skin edges were then re-apposed with running  sutures.  The skin graft was then placed in the primary defect and oriented appropriately.

## 2021-03-31 PROCEDURE — 99490 CHRNC CARE MGMT STAFF 1ST 20: CPT

## 2021-04-17 ENCOUNTER — PATIENT OUTREACH (OUTPATIENT)
Dept: CASE MANAGEMENT | Age: 66
End: 2021-04-17

## 2021-04-29 ENCOUNTER — PATIENT OUTREACH (OUTPATIENT)
Dept: CASE MANAGEMENT | Age: 66
End: 2021-04-29

## 2021-04-29 DIAGNOSIS — I10 BENIGN ESSENTIAL HYPERTENSION: ICD-10-CM

## 2021-04-29 DIAGNOSIS — E83.42 HYPOMAGNESEMIA: ICD-10-CM

## 2021-04-29 DIAGNOSIS — M05.79 RHEUMATOID ARTHRITIS INVOLVING MULTIPLE SITES WITH POSITIVE RHEUMATOID FACTOR (HCC): ICD-10-CM

## 2021-04-29 DIAGNOSIS — K90.0 CELIAC DISEASE: ICD-10-CM

## 2021-04-29 DIAGNOSIS — M15.9 PRIMARY OSTEOARTHRITIS INVOLVING MULTIPLE JOINTS: ICD-10-CM

## 2021-04-29 DIAGNOSIS — D50.9 IRON DEFICIENCY ANEMIA, UNSPECIFIED IRON DEFICIENCY ANEMIA TYPE: ICD-10-CM

## 2021-04-29 NOTE — PROGRESS NOTES
4/29/2021  Spoke to Wily for CCM.       Updates to patient care team/ comments: None  Patient reported changes in medications: None  Med Adherence  Comment: Taking as directed     Health Maintenance: Reviewed  COVID-19 Vaccine(2 - Moderna 2-dose series) du outreach:                        Wt loss    • Patient reported progress toward goal: Works on portion control and cutting back on carbs      • Update to previous barriers: n/a    • Patient Reported New Barriers And Concerns: None                   - Plan fo

## 2021-04-30 PROCEDURE — 99490 CHRNC CARE MGMT STAFF 1ST 20: CPT

## 2021-05-06 ENCOUNTER — TELEPHONE (OUTPATIENT)
Dept: FAMILY MEDICINE CLINIC | Facility: CLINIC | Age: 66
End: 2021-05-06

## 2021-05-06 ENCOUNTER — OFFICE VISIT (OUTPATIENT)
Dept: FAMILY MEDICINE CLINIC | Facility: CLINIC | Age: 66
End: 2021-05-06
Payer: MEDICARE

## 2021-05-06 VITALS
TEMPERATURE: 98 F | DIASTOLIC BLOOD PRESSURE: 64 MMHG | OXYGEN SATURATION: 96 % | SYSTOLIC BLOOD PRESSURE: 120 MMHG | RESPIRATION RATE: 18 BRPM | HEART RATE: 63 BPM

## 2021-05-06 DIAGNOSIS — H65.191 OTHER NON-RECURRENT ACUTE NONSUPPURATIVE OTITIS MEDIA OF RIGHT EAR: Primary | ICD-10-CM

## 2021-05-06 DIAGNOSIS — J30.1 SEASONAL ALLERGIC RHINITIS DUE TO POLLEN: ICD-10-CM

## 2021-05-06 PROCEDURE — 99213 OFFICE O/P EST LOW 20 MIN: CPT | Performed by: NURSE PRACTITIONER

## 2021-05-06 RX ORDER — LORATADINE 10 MG/1
10 TABLET ORAL DAILY
COMMUNITY

## 2021-05-06 RX ORDER — AMOXICILLIN AND CLAVULANATE POTASSIUM 875; 125 MG/1; MG/1
1 TABLET, FILM COATED ORAL 2 TIMES DAILY
Qty: 20 TABLET | Refills: 0 | Status: SHIPPED | OUTPATIENT
Start: 2021-05-06 | End: 2021-05-16

## 2021-05-06 NOTE — PATIENT INSTRUCTIONS
Start Augmentin 1 tablet twice a day for 10 days, take with food. Monitor for side effects specifically stomach upset and diarrhea as discussed. If these occur notify the office, may need to change to plain amoxicillin.   Switch your antihistamine, switch

## 2021-05-06 NOTE — PROGRESS NOTES
CHIEF COMPLAINT:   Patient presents with:  Ear Pain: R ear pain   Cough  Post Nasal Drip  Allergies      HPI:   Doris Mendze is a 74year old female who presents to clinic today with complaints of right ear pain, cough, post nasal drip and allergy symp (PRESERVISION AREDS) Oral Tab Take 1 tablet by mouth 2 (two) times daily. • Folic Acid-Vit B9-EDU Q95 (VIRT-FATOU) 2.5-25-1 MG Oral Tab Take 1 tablet by mouth daily.      • Calcium Citrate-Vitamin D (CITRACAL MAXIMUM) 315-250 MG-UNIT Oral Tab Take 1 tabl Encounters:  12/28/20 : 244 lb 3.2 oz (110.8 kg)  12/27/19 : 236 lb 6.4 oz (107.2 kg)  03/18/19 : 239 lb (108.4 kg)  12/14/18 : 236 lb (107 kg)  02/28/18 : 241 lb 8 oz (109.5 kg)  11/22/17 : 231 lb 2 oz (104.8 kg)    No height and weight on file for this e Instructions  Patient voiced understanding and is in agreement with treatment plan. Follow up with primary care provider in 2-3 days if symptoms do not improve or if symptoms worsen. Risk and benefits of medication discussed.    Stressed importance of co

## 2021-05-06 NOTE — TELEPHONE ENCOUNTER
Symptom onset for 2 weeks with seasonal allergies, ear stuffiness and pressure, slight cough and PND. Started having pain in ear 3 days ago. Denies fever, sore throat, chest congestion or SOB. Denies known covid exposure.      Appt given in respiratory

## 2021-05-25 ENCOUNTER — PATIENT OUTREACH (OUTPATIENT)
Dept: CASE MANAGEMENT | Age: 66
End: 2021-05-25

## 2021-06-03 ENCOUNTER — PATIENT OUTREACH (OUTPATIENT)
Dept: CASE MANAGEMENT | Age: 66
End: 2021-06-03

## 2021-06-03 DIAGNOSIS — K90.0 CELIAC DISEASE: ICD-10-CM

## 2021-06-03 DIAGNOSIS — E04.9 GOITER: ICD-10-CM

## 2021-06-03 DIAGNOSIS — D50.9 IRON DEFICIENCY ANEMIA, UNSPECIFIED IRON DEFICIENCY ANEMIA TYPE: ICD-10-CM

## 2021-06-03 DIAGNOSIS — E83.42 HYPOMAGNESEMIA: ICD-10-CM

## 2021-06-03 DIAGNOSIS — M15.9 PRIMARY OSTEOARTHRITIS INVOLVING MULTIPLE JOINTS: ICD-10-CM

## 2021-06-03 DIAGNOSIS — E87.6 HYPOPOTASSEMIA: ICD-10-CM

## 2021-06-03 DIAGNOSIS — I10 BENIGN ESSENTIAL HYPERTENSION: ICD-10-CM

## 2021-06-03 DIAGNOSIS — M05.79 RHEUMATOID ARTHRITIS INVOLVING MULTIPLE SITES WITH POSITIVE RHEUMATOID FACTOR (HCC): ICD-10-CM

## 2021-06-03 NOTE — PROGRESS NOTES
6/3/2021  Spoke to Wily for CCM.       Updates to patient care team/ comments: None  Patient reported changes in medications: None  Med Adherence  Comment: Taking as directed     Health Maintenance: Reviewed  Mammogram due on 05/09/2021 - completed 5/20/21 - Plan for overcoming all barriers: n/a            Patient agrees to goal action plan.   Self-Management Abilities (patient reported)             1= least confident in achieving goal, 5= very confident               - confidence: : 4      Care Manager F

## 2021-06-30 PROCEDURE — 99490 CHRNC CARE MGMT STAFF 1ST 20: CPT

## 2021-07-07 ENCOUNTER — PATIENT OUTREACH (OUTPATIENT)
Dept: CASE MANAGEMENT | Age: 66
End: 2021-07-07

## 2021-07-13 ENCOUNTER — LAB ENCOUNTER (OUTPATIENT)
Dept: LAB | Age: 66
End: 2021-07-13
Attending: FAMILY MEDICINE
Payer: MEDICARE

## 2021-07-13 ENCOUNTER — OFFICE VISIT (OUTPATIENT)
Dept: FAMILY MEDICINE CLINIC | Facility: CLINIC | Age: 66
End: 2021-07-13
Payer: MEDICARE

## 2021-07-13 VITALS
BODY MASS INDEX: 46 KG/M2 | RESPIRATION RATE: 16 BRPM | HEART RATE: 64 BPM | TEMPERATURE: 98 F | WEIGHT: 256 LBS | DIASTOLIC BLOOD PRESSURE: 80 MMHG | SYSTOLIC BLOOD PRESSURE: 138 MMHG

## 2021-07-13 DIAGNOSIS — R73.09 ABNORMAL GLUCOSE: ICD-10-CM

## 2021-07-13 DIAGNOSIS — I10 BENIGN ESSENTIAL HYPERTENSION: Primary | ICD-10-CM

## 2021-07-13 DIAGNOSIS — M54.2 NECK PAIN: ICD-10-CM

## 2021-07-13 DIAGNOSIS — H83.03 LABYRINTHITIS OF BOTH EARS: ICD-10-CM

## 2021-07-13 DIAGNOSIS — M15.9 PRIMARY OSTEOARTHRITIS INVOLVING MULTIPLE JOINTS: ICD-10-CM

## 2021-07-13 DIAGNOSIS — M19.012 ARTHROPATHY OF LEFT SHOULDER: ICD-10-CM

## 2021-07-13 PROBLEM — L40.50 PSORIATIC ARTHRITIS (HCC): Status: ACTIVE | Noted: 2021-03-26

## 2021-07-13 PROBLEM — I73.9 PAD (PERIPHERAL ARTERY DISEASE) (HCC): Status: ACTIVE | Noted: 2018-03-21

## 2021-07-13 PROBLEM — R76.12 POSITIVE QUANTIFERON-TB GOLD TEST: Status: ACTIVE | Noted: 2021-03-26

## 2021-07-13 LAB
ALBUMIN SERPL-MCNC: 3.9 G/DL (ref 3.4–5)
ALBUMIN/GLOB SERPL: 1.1 {RATIO} (ref 1–2)
ALP LIVER SERPL-CCNC: 110 U/L
ALT SERPL-CCNC: 43 U/L
ANION GAP SERPL CALC-SCNC: 6 MMOL/L (ref 0–18)
AST SERPL-CCNC: 29 U/L (ref 15–37)
BILIRUB SERPL-MCNC: 0.4 MG/DL (ref 0.1–2)
BUN BLD-MCNC: 23 MG/DL (ref 7–18)
BUN/CREAT SERPL: 25.8 (ref 10–20)
CALCIUM BLD-MCNC: 9.3 MG/DL (ref 8.5–10.1)
CHLORIDE SERPL-SCNC: 107 MMOL/L (ref 98–112)
CHOLEST SMN-MCNC: 139 MG/DL (ref ?–200)
CO2 SERPL-SCNC: 29 MMOL/L (ref 21–32)
CREAT BLD-MCNC: 0.89 MG/DL
EST. AVERAGE GLUCOSE BLD GHB EST-MCNC: 120 MG/DL (ref 68–126)
GLOBULIN PLAS-MCNC: 3.5 G/DL (ref 2.8–4.4)
GLUCOSE BLD-MCNC: 105 MG/DL (ref 70–99)
HBA1C MFR BLD HPLC: 5.8 % (ref ?–5.7)
HDLC SERPL-MCNC: 50 MG/DL (ref 40–59)
LDLC SERPL CALC-MCNC: 68 MG/DL (ref ?–100)
M PROTEIN MFR SERPL ELPH: 7.4 G/DL (ref 6.4–8.2)
NONHDLC SERPL-MCNC: 89 MG/DL (ref ?–130)
OSMOLALITY SERPL CALC.SUM OF ELEC: 298 MOSM/KG (ref 275–295)
PATIENT FASTING Y/N/NP: YES
PATIENT FASTING Y/N/NP: YES
POTASSIUM SERPL-SCNC: 4.1 MMOL/L (ref 3.5–5.1)
SODIUM SERPL-SCNC: 142 MMOL/L (ref 136–145)
TRIGL SERPL-MCNC: 119 MG/DL (ref 30–149)
VLDLC SERPL CALC-MCNC: 18 MG/DL (ref 0–30)

## 2021-07-13 PROCEDURE — 83036 HEMOGLOBIN GLYCOSYLATED A1C: CPT | Performed by: FAMILY MEDICINE

## 2021-07-13 PROCEDURE — 36415 COLL VENOUS BLD VENIPUNCTURE: CPT | Performed by: FAMILY MEDICINE

## 2021-07-13 PROCEDURE — 80061 LIPID PANEL: CPT | Performed by: FAMILY MEDICINE

## 2021-07-13 PROCEDURE — 99214 OFFICE O/P EST MOD 30 MIN: CPT | Performed by: FAMILY MEDICINE

## 2021-07-13 PROCEDURE — 80053 COMPREHEN METABOLIC PANEL: CPT | Performed by: FAMILY MEDICINE

## 2021-07-13 RX ORDER — MULTIVITAMIN WITH IRON
250 TABLET ORAL NIGHTLY
COMMUNITY

## 2021-07-13 RX ORDER — MECLIZINE HCL 12.5 MG/1
12.5 TABLET ORAL 3 TIMES DAILY PRN
Qty: 30 TABLET | Refills: 0 | Status: SHIPPED | OUTPATIENT
Start: 2021-07-13

## 2021-07-13 NOTE — PROGRESS NOTES
[place] [facility]  PROGRESS NOTE  Chief Complaint:   Patient presents with:  Medication Follow-Up  Dizziness: started last Friday      HPI:   This is a 74year old female coming in for eval of dizziness  Started 4 days ago- started as got up fr 120 68 - 126 mg/dL   LIPID PANEL   Result Value Ref Range    Cholesterol, Total 139 <200 mg/dL    HDL Cholesterol 50 40 - 59 mg/dL    Triglycerides 119 30 - 149 mg/dL    LDL Cholesterol 68 <100 mg/dL    VLDL 18 0 - 30 mg/dL    Non HDL Chol 89 <130 mg/dL Relation Age of Onset   • Cancer Father    • Hypertension Father    • Cancer Mother    • Hypertension Mother    • Cancer Maternal Grandmother    • Diabetes Maternal Grandmother    • Cancer Maternal Grandfather    • Diabetes Maternal Grandfather    • Diabet 3   • Fluticasone Propionate 50 MCG/ACT Nasal Suspension 1 spray by Nasal route daily. • Multiple Vitamins-Minerals (PRESERVISION AREDS) Oral Tab Take 1 tablet by mouth 2 (two) times daily.      • Folic Acid-Vit S4-DTD S03 (VIRT-FATOU) 2.5-25-1 MG Oral 1/24/20: 5' 2.5\" (1.588 m). Weight as of this encounter: 256 lb (116.1 kg). Vital signs reviewed. Appears stated age, well groomed. Physical Exam:  GEN:  Patient is alert, awake and oriented, well developed, well nourished, no apparent distress.   JUAN with allergy treatment follow-up if not improving in a week    4. Neck pain  Osteoarthritis suspected consider x-rays. Discussed patient range of motion exercises patient wanting to observe today with treating labyrinthitis.     5. Arthropathy of left shou MG Oral Tab 30 tablet 0     Sig: Take 1 tablet (12.5 mg total) by mouth 3 (three) times daily as needed.        Health Maintenance:        Arvind Glover MD  7/13/2021  11:12 AM    Patient/Caregiver Education: Patient/Caregiver Education: There are no b

## 2021-07-13 NOTE — PATIENT INSTRUCTIONS
rec compression stocking daily    rec fasting labs today     rec antivert for vertigo    Encourage neck and shoulder stretches     Encourage activity in general

## 2021-07-14 ENCOUNTER — TELEPHONE (OUTPATIENT)
Dept: FAMILY MEDICINE CLINIC | Facility: CLINIC | Age: 66
End: 2021-07-14

## 2021-07-14 NOTE — TELEPHONE ENCOUNTER
----- Message from Giselle Rapp MD sent at 7/13/2021  8:29 PM CDT -----  Laboratory results reviewed. Hemoglobin A1c stable at 5. 8. Lipid profile normal.  Chemistry profile shows glucose 105 renal function normal liver enzymes normal.  Laboratory res

## 2021-07-23 ENCOUNTER — PATIENT MESSAGE (OUTPATIENT)
Dept: FAMILY MEDICINE CLINIC | Facility: CLINIC | Age: 66
End: 2021-07-23

## 2021-07-23 DIAGNOSIS — H83.03 LABYRINTHITIS OF BOTH EARS: Primary | ICD-10-CM

## 2021-07-23 DIAGNOSIS — R42 VERTIGO: ICD-10-CM

## 2021-07-23 NOTE — TELEPHONE ENCOUNTER
From: Palmer De Souza  To: Mark Lopez MD  Sent: 7/23/2021 4:18 PM CDT  Subject: Non-Urgent Medical Question    I saw Dr. Kate Talbert on Tuesday July 13 and she prescribed Meclizine for me for dizziness and fluid in the right ear.  The dizziness is bet

## 2021-07-26 NOTE — TELEPHONE ENCOUNTER
Referral faxed to ENT. Left detailed message for pt. Asked Edwina Vazquez to call back with any questions.

## 2021-07-27 ENCOUNTER — PATIENT OUTREACH (OUTPATIENT)
Dept: CASE MANAGEMENT | Age: 66
End: 2021-07-27

## 2021-07-27 DIAGNOSIS — M05.79 RHEUMATOID ARTHRITIS INVOLVING MULTIPLE SITES WITH POSITIVE RHEUMATOID FACTOR (HCC): ICD-10-CM

## 2021-07-27 DIAGNOSIS — J30.2 CHRONIC SEASONAL ALLERGIC RHINITIS: ICD-10-CM

## 2021-07-27 DIAGNOSIS — E83.42 HYPOMAGNESEMIA: ICD-10-CM

## 2021-07-27 DIAGNOSIS — I10 BENIGN ESSENTIAL HYPERTENSION: ICD-10-CM

## 2021-07-27 DIAGNOSIS — M15.9 PRIMARY OSTEOARTHRITIS INVOLVING MULTIPLE JOINTS: ICD-10-CM

## 2021-07-27 DIAGNOSIS — I73.9 PAD (PERIPHERAL ARTERY DISEASE) (HCC): ICD-10-CM

## 2021-07-27 DIAGNOSIS — K90.0 CELIAC DISEASE: ICD-10-CM

## 2021-07-27 DIAGNOSIS — G62.9 NEUROPATHY: ICD-10-CM

## 2021-07-27 NOTE — PROGRESS NOTES
7/27/2021  Spoke to Wily for CCM.       Updates to patient care team/ comments: None  Patient reported changes in medications: None  Med Adherence  Comment: Taking as directed     Health Maintenance: Up to date  Influenza Vaccine(1) due on 10/01/2021  Mamm - confidence: : 4      Care Manager Follow Up: 1 month  Reason For Follow Up: review progress and or barriers towards patients goals.      Care managers interventions: Listened to pt discuss frustration with wt gain and elevated A1c, provided suppo

## 2021-07-31 PROCEDURE — 99490 CHRNC CARE MGMT STAFF 1ST 20: CPT

## 2021-08-18 RX ORDER — OMEPRAZOLE 40 MG/1
40 CAPSULE, DELAYED RELEASE ORAL DAILY
Qty: 90 CAPSULE | Refills: 3 | Status: SHIPPED | OUTPATIENT
Start: 2021-08-18 | End: 2022-12-29

## 2021-08-18 NOTE — TELEPHONE ENCOUNTER
Future appt:    Last Appointment with provider:   7/13/2021; No f/u recommended    Last appointment at [facility]:  7/13/2021  Cholesterol, Total (mg/dL)   Date Value   07/13/2021 139     Total Cholesterol (mg/dL)   Date Value   03/09/2019 127     HDL Cholesterol (mg/dL)   Date Value   07/13/2021 50   03/09/2019 51     LDL Cholesterol (mg/dL)   Date Value   07/13/2021 68   03/09/2019 52     Triglycerides (mg/dL)   Date Value   07/13/2021 119   03/09/2019 119     Lab Results   Component Value Date     07/13/2021    A1C 5.8 (H) 07/13/2021     Lab Results   Component Value Date    TSH 1.510 12/22/2020     Last RF:  1/29/2021    No follow-ups on file.

## 2021-08-25 ENCOUNTER — PATIENT OUTREACH (OUTPATIENT)
Dept: CASE MANAGEMENT | Age: 66
End: 2021-08-25

## 2021-08-25 DIAGNOSIS — I73.9 PAD (PERIPHERAL ARTERY DISEASE) (HCC): ICD-10-CM

## 2021-08-25 DIAGNOSIS — K76.0 FATTY LIVER: ICD-10-CM

## 2021-08-25 DIAGNOSIS — I10 BENIGN ESSENTIAL HYPERTENSION: ICD-10-CM

## 2021-08-25 DIAGNOSIS — G62.9 NEUROPATHY: ICD-10-CM

## 2021-08-25 DIAGNOSIS — M05.79 RHEUMATOID ARTHRITIS INVOLVING MULTIPLE SITES WITH POSITIVE RHEUMATOID FACTOR (HCC): ICD-10-CM

## 2021-08-25 DIAGNOSIS — L40.50 PSORIATIC ARTHRITIS (HCC): ICD-10-CM

## 2021-08-25 DIAGNOSIS — M85.832 OSTEOPENIA OF LEFT FOREARM: ICD-10-CM

## 2021-08-25 DIAGNOSIS — J30.2 CHRONIC SEASONAL ALLERGIC RHINITIS: ICD-10-CM

## 2021-08-25 DIAGNOSIS — K90.0 CELIAC DISEASE: ICD-10-CM

## 2021-08-25 DIAGNOSIS — E83.42 HYPOMAGNESEMIA: ICD-10-CM

## 2021-08-25 DIAGNOSIS — E04.9 GOITER: ICD-10-CM

## 2021-08-25 DIAGNOSIS — E87.6 HYPOPOTASSEMIA: ICD-10-CM

## 2021-08-25 DIAGNOSIS — M15.9 PRIMARY OSTEOARTHRITIS INVOLVING MULTIPLE JOINTS: ICD-10-CM

## 2021-08-25 NOTE — PROGRESS NOTES
8/25/2021  Spoke to Wily for CCM.       Updates to patient care team/ comments: None  Patient reported changes in medications: Finished meclizine   Med Adherence  Comment: Taking as directed     Health Maintenance: Up to date  Mammogram due on 11/20/2021 Goals/Action Plan:    • Active goal from previous outreach:                        Wt loss    • Patient reported progress toward goal: Wt is steady      • Update to previous barriers: n/a    • Patient Reported New Barriers And Concerns: None

## 2021-08-31 PROCEDURE — 99490 CHRNC CARE MGMT STAFF 1ST 20: CPT

## 2021-09-17 RX ORDER — METOPROLOL SUCCINATE 25 MG/1
TABLET, EXTENDED RELEASE ORAL
Qty: 90 TABLET | Refills: 0 | Status: SHIPPED | OUTPATIENT
Start: 2021-09-17 | End: 2021-12-28

## 2021-09-17 NOTE — TELEPHONE ENCOUNTER
Future appt:    Last Appointment with provider:   7/13/2021  Last appointment at [facility]:  7/13/2021  Last px: 12/28/20    Metoprolol refilled on 1/29/21 for #90, 1 refill    Pt contacted. Pt confirmed she is taking medication every day.   Claire grier

## 2021-09-20 ENCOUNTER — TELEPHONE (OUTPATIENT)
Dept: FAMILY MEDICINE CLINIC | Facility: CLINIC | Age: 66
End: 2021-09-20

## 2021-09-20 DIAGNOSIS — E78.1 HYPERTRIGLYCERIDEMIA: ICD-10-CM

## 2021-09-20 DIAGNOSIS — R73.03 PREDIABETES: ICD-10-CM

## 2021-09-20 DIAGNOSIS — D68.9 COAGULOPATHY (HCC): ICD-10-CM

## 2021-09-20 DIAGNOSIS — Z00.00 ENCOUNTER FOR ANNUAL HEALTH EXAMINATION: ICD-10-CM

## 2021-09-20 DIAGNOSIS — E83.42 HYPOMAGNESEMIA: ICD-10-CM

## 2021-09-20 DIAGNOSIS — Z00.01 ENCOUNTER FOR GENERAL ADULT MEDICAL EXAMINATION WITH ABNORMAL FINDINGS: Primary | ICD-10-CM

## 2021-09-20 NOTE — TELEPHONE ENCOUNTER
Need lab orders for appt on 12/27/21.     Future Appointments   Date Time Provider Syd Zee   12/27/2021 11:00 AM REF [facility]   12/29/2021  2:00 PM Rocío Burns MD [facility]

## 2021-09-28 ENCOUNTER — PATIENT OUTREACH (OUTPATIENT)
Dept: CASE MANAGEMENT | Age: 66
End: 2021-09-28

## 2021-09-28 NOTE — PROGRESS NOTES
Called patient for monthly CCM outreach, pt is getting ready to leave but areed to a call back tomorrow.       Chart review - 3 min  Time with patient - 1 min  Total time - 4 min

## 2021-10-06 ENCOUNTER — PATIENT OUTREACH (OUTPATIENT)
Dept: CASE MANAGEMENT | Age: 66
End: 2021-10-06

## 2021-10-06 DIAGNOSIS — I73.9 PAD (PERIPHERAL ARTERY DISEASE) (HCC): ICD-10-CM

## 2021-10-06 DIAGNOSIS — M85.832 OSTEOPENIA OF LEFT FOREARM: ICD-10-CM

## 2021-10-06 DIAGNOSIS — E87.6 HYPOPOTASSEMIA: ICD-10-CM

## 2021-10-06 DIAGNOSIS — K76.0 FATTY LIVER: ICD-10-CM

## 2021-10-06 DIAGNOSIS — M15.9 PRIMARY OSTEOARTHRITIS INVOLVING MULTIPLE JOINTS: ICD-10-CM

## 2021-10-06 DIAGNOSIS — G62.9 NEUROPATHY: ICD-10-CM

## 2021-10-06 DIAGNOSIS — M05.79 RHEUMATOID ARTHRITIS INVOLVING MULTIPLE SITES WITH POSITIVE RHEUMATOID FACTOR (HCC): ICD-10-CM

## 2021-10-06 DIAGNOSIS — I10 BENIGN ESSENTIAL HYPERTENSION: ICD-10-CM

## 2021-10-06 DIAGNOSIS — K90.0 CELIAC DISEASE: ICD-10-CM

## 2021-10-06 DIAGNOSIS — L40.50 PSORIATIC ARTHRITIS (HCC): ICD-10-CM

## 2021-10-06 DIAGNOSIS — J30.2 CHRONIC SEASONAL ALLERGIC RHINITIS: ICD-10-CM

## 2021-10-06 DIAGNOSIS — E83.42 HYPOMAGNESEMIA: ICD-10-CM

## 2021-10-06 NOTE — PROGRESS NOTES
10/6/2021  Spoke to Jacobo Watson for CCM.       Updates to patient care team/ comments: None  Patient reported changes in medications: Xlear added, fluticasone ended  Med Adherence  Comment: Taking as directed      Health Maintenance: Up to date  Mammogram due on Patient agrees to goal action plan.   Self-Management Abilities (patient reported)             1= least confident in achieving goal, 5= very confident               - confidence: : 4      Care Manager Follow Up: 1 month  Reason For Follow Up: review pro

## 2021-10-31 PROCEDURE — 99490 CHRNC CARE MGMT STAFF 1ST 20: CPT

## 2021-11-05 ENCOUNTER — PATIENT OUTREACH (OUTPATIENT)
Dept: CASE MANAGEMENT | Age: 66
End: 2021-11-05

## 2021-11-12 ENCOUNTER — TELEPHONE (OUTPATIENT)
Dept: FAMILY MEDICINE CLINIC | Facility: CLINIC | Age: 66
End: 2021-11-12

## 2021-11-12 DIAGNOSIS — R92.8 ABNORMALITY OF RIGHT BREAST ON SCREENING MAMMOGRAM: Primary | ICD-10-CM

## 2021-11-12 NOTE — TELEPHONE ENCOUNTER
Pt had screening mammogram at [facility] on 11/11/21-  Pt needs additional imaging on right breast.    \"FINDINGS:   The breasts have scattered areas of fibroglandular density.  There are 2   clusters of calcifications in the 9 o'clock position of the right maurilio

## 2021-11-24 ENCOUNTER — PATIENT OUTREACH (OUTPATIENT)
Dept: CASE MANAGEMENT | Age: 66
End: 2021-11-24

## 2021-12-08 ENCOUNTER — PATIENT OUTREACH (OUTPATIENT)
Dept: CASE MANAGEMENT | Age: 66
End: 2021-12-08

## 2021-12-08 DIAGNOSIS — G62.9 NEUROPATHY: ICD-10-CM

## 2021-12-08 DIAGNOSIS — E87.6 HYPOPOTASSEMIA: ICD-10-CM

## 2021-12-08 DIAGNOSIS — M15.9 PRIMARY OSTEOARTHRITIS INVOLVING MULTIPLE JOINTS: ICD-10-CM

## 2021-12-08 DIAGNOSIS — I10 BENIGN ESSENTIAL HYPERTENSION: ICD-10-CM

## 2021-12-08 DIAGNOSIS — I73.9 PAD (PERIPHERAL ARTERY DISEASE) (HCC): ICD-10-CM

## 2021-12-08 DIAGNOSIS — M05.79 RHEUMATOID ARTHRITIS INVOLVING MULTIPLE SITES WITH POSITIVE RHEUMATOID FACTOR (HCC): ICD-10-CM

## 2021-12-08 DIAGNOSIS — J30.2 CHRONIC SEASONAL ALLERGIC RHINITIS: ICD-10-CM

## 2021-12-08 DIAGNOSIS — K90.0 CELIAC DISEASE: ICD-10-CM

## 2021-12-08 DIAGNOSIS — E83.42 HYPOMAGNESEMIA: ICD-10-CM

## 2021-12-08 DIAGNOSIS — L40.50 PSORIATIC ARTHRITIS (HCC): ICD-10-CM

## 2021-12-08 NOTE — PROGRESS NOTES
12/8/2021  Spoke to Wily for CCM.       Updates to patient care team/ comments: None  Patient reported changes in medications: prednisone added  Med Adherence  Comment: Taking as directed     Health Maintenance: Up to date  Annual Physical due on 12/28/202 Plan:    • Active goal from previous outreach:                        Wt loss    • Patient reported progress toward goal: Slow due to physical limitations      • Update to previous barriers: n/a    • Patient Reported New Barriers And Concerns: None

## 2021-12-20 RX ORDER — ESCITALOPRAM OXALATE 10 MG/1
10 TABLET ORAL DAILY
Qty: 90 TABLET | Refills: 0 | Status: SHIPPED | OUTPATIENT
Start: 2021-12-20 | End: 2022-07-25

## 2021-12-20 RX ORDER — TRIAMTERENE AND HYDROCHLOROTHIAZIDE 37.5; 25 MG/1; MG/1
1 CAPSULE ORAL DAILY
Qty: 90 CAPSULE | Refills: 0 | Status: SHIPPED | OUTPATIENT
Start: 2021-12-20 | End: 2022-07-28 | Stop reason: ALTCHOICE

## 2021-12-20 NOTE — TELEPHONE ENCOUNTER
Future appt: Your appointments     Date & Time Appointment Department [facility])    Dec 27, 2021 11:00 AM CST Laboratory Visit with REF [facility] ([facility])        Dec 29, 2021  2:00 PM CST Medicare Annual Well Visit with Kwasi Culp MD [address] [facility], [place] ([facility])            [address] [facility], [address] [place] [address] Group [place]  [facility] [id number]  [address] [place] [address] Reference Lab  [facility] [address] [facility] [address] [phone]        Last Appointment with provider:   7/13/2021  Last appointment at [facility]:  7/13/2021  Last px: 12/28/2020    Cholesterol, Total (mg/dL)   Date Value   07/13/2021 139     Total Cholesterol (mg/dL)   Date Value   03/09/2019 127     HDL Cholesterol (mg/dL)   Date Value   07/13/2021 50   03/09/2019 51     LDL Cholesterol (mg/dL)   Date Value   07/13/2021 68   03/09/2019 52     Triglycerides (mg/dL)   Date Value   07/13/2021 119   03/09/2019 119     Lab Results   Component Value Date     07/13/2021    A1C 5.8 (H) 07/13/2021     Lab Results   Component Value Date    TSH 1.510 12/22/2020       No follow-ups on file.

## 2021-12-28 RX ORDER — METOPROLOL SUCCINATE 25 MG/1
25 TABLET, EXTENDED RELEASE ORAL DAILY
Qty: 90 TABLET | Refills: 1 | Status: SHIPPED | OUTPATIENT
Start: 2021-12-28 | End: 2023-01-16

## 2021-12-28 NOTE — TELEPHONE ENCOUNTER
Future appt:    Last Appointment with provider:   7/13/2021 for medication follow up, HTN  Last appointment at [facility]:  7/13/2021  Cholesterol, Total (mg/dL)   Date Value   07/13/2021 139     Total Cholesterol (mg/dL)   Date Value   03/09/2019 127     HDL Cholesterol (mg/dL)   Date Value   07/13/2021 50   03/09/2019 51     LDL Cholesterol (mg/dL)   Date Value   07/13/2021 68   03/09/2019 52     Triglycerides (mg/dL)   Date Value   07/13/2021 119   03/09/2019 119     Lab Results   Component Value Date     07/13/2021    A1C 5.8 (H) 07/13/2021     Lab Results   Component Value Date    TSH 1.510 12/22/2020       No follow-ups on file.

## 2021-12-31 PROCEDURE — 99490 CHRNC CARE MGMT STAFF 1ST 20: CPT

## 2022-01-11 ENCOUNTER — PATIENT OUTREACH (OUTPATIENT)
Dept: CASE MANAGEMENT | Age: 67
End: 2022-01-11

## 2022-01-11 NOTE — PROGRESS NOTES
AYE verified for CCM monthly outreach. Wanted to introduce myself as new CCM. I called patient and left a detailed message to call back. I will follow up with patient at a later time.       Medical record reviewed including recent office visits and t

## 2022-01-20 NOTE — TELEPHONE ENCOUNTER
Future appt:    Last Appointment with provider:   7/13/2021  Last appointment at [facility]:  7/13/2021      Cholesterol, Total (mg/dL)   Date Value   07/13/2021 139     Total Cholesterol (mg/dL)   Date Value   03/09/2019 127     HDL Cholesterol (mg/dL)

## 2022-01-21 RX ORDER — POTASSIUM CHLORIDE 20 MEQ/1
TABLET, EXTENDED RELEASE ORAL
Qty: 90 TABLET | Refills: 0 | Status: SHIPPED | OUTPATIENT
Start: 2022-01-21

## 2022-02-07 ENCOUNTER — PATIENT OUTREACH (OUTPATIENT)
Dept: CASE MANAGEMENT | Age: 67
End: 2022-02-07

## 2022-02-07 NOTE — PROGRESS NOTES
AYE verified for CCM monthly outreach. I called patient and left a detailed message to call back. I will follow up with patient at a later time.       Medical record reviewed including recent office visits and test results    Total time spent with patient including chart review: 5 min  Time spent with patient this month:  5     Total time spent with communication and chart review this month to date: 5 min

## 2022-02-28 ENCOUNTER — TELEPHONE (OUTPATIENT)
Dept: FAMILY MEDICINE CLINIC | Facility: CLINIC | Age: 67
End: 2022-02-28

## 2022-02-28 NOTE — TELEPHONE ENCOUNTER
Future appt:    Last Appointment with provider:   Visit date not found  Last appointment at [facility]:  Visit date not found  Last OV:  7/13/21  Last px: 12/28/20. Pt overdue for px and labs. LM for pt  Cholesterol, Total (mg/dL)   Date Value   07/13/2021 139     Total Cholesterol (mg/dL)   Date Value   03/09/2019 127     HDL Cholesterol (mg/dL)   Date Value   07/13/2021 50   03/09/2019 51     LDL Cholesterol (mg/dL)   Date Value   07/13/2021 68   03/09/2019 52     Triglycerides (mg/dL)   Date Value   07/13/2021 119   03/09/2019 119     Lab Results   Component Value Date     07/13/2021    A1C 5.8 (H) 07/13/2021     Lab Results   Component Value Date    TSH 1.510 12/22/2020       No follow-ups on file.

## 2022-03-02 NOTE — TELEPHONE ENCOUNTER
Spoke with pt, pt currently in Rehab in [facility]. Pt states she has been in rehab since early Jan.  Pt agreed to call for appt when released home. No future appointments.

## 2022-03-10 ENCOUNTER — PATIENT OUTREACH (OUTPATIENT)
Dept: CASE MANAGEMENT | Age: 67
End: 2022-03-10

## 2022-03-10 NOTE — PROGRESS NOTES
AYE verified for CCM monthly outreach. I called patient and left a detailed message to call back. I will follow up with patient at a later time.       Medical record reviewed including recent office visits and test results    Total time spent with patient including chart review: 5 min   Time spent with patient this month:5 min     Total time spent with communication and chart review this month to date: 5 min

## 2022-03-16 RX ORDER — TRIAMTERENE AND HYDROCHLOROTHIAZIDE 37.5; 25 MG/1; MG/1
CAPSULE ORAL
Qty: 90 CAPSULE | Refills: 0 | OUTPATIENT
Start: 2022-03-16

## 2022-03-16 RX ORDER — ESCITALOPRAM OXALATE 10 MG/1
TABLET ORAL
Qty: 90 TABLET | Refills: 0 | OUTPATIENT
Start: 2022-03-16

## 2022-03-16 NOTE — TELEPHONE ENCOUNTER
Currently in [employer] in [facility] recovering from hip replacement.  states that she will be there for at least another 6 to 8 weeks.  states they will call when she is discharged. Please advise on refill. Future appt:    Last Appointment with provider: 7/13/2021  [facility] [place] [facility]: 7/13/2021  Last px: 12/28/2020  Cholesterol, Total (mg/dL)   Date Value   07/13/2021 139     Total Cholesterol (mg/dL)   Date Value   03/09/2019 127     HDL Cholesterol (mg/dL)   Date Value   07/13/2021 50   03/09/2019 51     LDL Cholesterol (mg/dL)   Date Value   07/13/2021 68   03/09/2019 52     Triglycerides (mg/dL)   Date Value   07/13/2021 119   03/09/2019 119     Lab Results   Component Value Date     07/13/2021    A1C 5.8 (H) 07/13/2021     Lab Results   Component Value Date    TSH 1.510 12/22/2020       No follow-ups on file.

## 2022-04-06 ENCOUNTER — PATIENT OUTREACH (OUTPATIENT)
Dept: CASE MANAGEMENT | Age: 67
End: 2022-04-06

## 2022-04-06 NOTE — PROGRESS NOTES
HIPPA verified for CCM monthly outreach. I called patient and no answer and no VM. According to recent notes still at [facility] rehab for hip. I will follow up with patient at a later time.       Medical record reviewed including recent office visits and test results

## 2022-05-06 ENCOUNTER — TELEPHONE (OUTPATIENT)
Dept: FAMILY MEDICINE CLINIC | Facility: CLINIC | Age: 67
End: 2022-05-06

## 2022-05-06 ENCOUNTER — PATIENT OUTREACH (OUTPATIENT)
Dept: CASE MANAGEMENT | Age: 67
End: 2022-05-06

## 2022-05-06 RX ORDER — DICYCLOMINE HCL 20 MG
20 TABLET ORAL
COMMUNITY

## 2022-05-06 RX ORDER — MAGNESIUM OXIDE 400 MG/1
400 TABLET ORAL NIGHTLY
COMMUNITY

## 2022-05-06 RX ORDER — ONDANSETRON 4 MG/1
4 TABLET, ORALLY DISINTEGRATING ORAL EVERY 8 HOURS PRN
COMMUNITY

## 2022-05-06 NOTE — TELEPHONE ENCOUNTER
Scheduled adjusted. Pt informed.     Future Appointments   Date Time Provider Syd Zee   5/13/2022  2:45 PM Ginny Holder MD [facility]

## 2022-05-06 NOTE — PROGRESS NOTES
AYE verified for CCM monthly outreach. I called patient and  states patient just woke up. I will follow up with patient at a later time.       Medical record reviewed including recent office visits and test results

## 2022-05-06 NOTE — TELEPHONE ENCOUNTER
Pt contacted- pt was released from [place] approx 2 weeks. Pt states she dislocated her left hip and ended up in [facility]. Pt states she also ended up with pneumonia and C-Diff. Pt mentioned having some weakness after having pneumonia. Pt scheduled for follow up next Friday. Pt agreed to call to have discharge notes faxed for PCP.       Future Appointments   Date Time Provider Syd Koch   5/13/2022  3:00 PM Nat Yao MD [facility]

## 2022-05-06 NOTE — TELEPHONE ENCOUNTER
Spoke to patient for monthly CCM since being discharged from [facility]. Patient discharged 4/19. Patient expressing concerns that no home health was ordered when discharged. She needs assistance. Unable to ambulate on her own or perform basic ADLS.  is currently acting as a caretaker having trouble assisting and is being burnt out. Will you be able to assist in ordering home health with patient and doing a virtual visit?      Please contact patient to advise    Thank you

## 2022-05-13 ENCOUNTER — TELEPHONE (OUTPATIENT)
Dept: FAMILY MEDICINE CLINIC | Facility: CLINIC | Age: 67
End: 2022-05-13

## 2022-05-13 PROBLEM — S72.002A CLOSED LEFT HIP FRACTURE (HCC): Status: ACTIVE | Noted: 2022-01-04

## 2022-05-13 PROBLEM — R22.42 HIP MASS, LEFT: Status: ACTIVE | Noted: 2022-01-02

## 2022-05-13 PROBLEM — S73.005A DISLOCATION OF LEFT HIP (HCC): Status: ACTIVE | Noted: 2022-01-04

## 2022-05-13 PROBLEM — I73.9 PERIPHERAL ARTERIAL DISEASE (HCC): Status: ACTIVE | Noted: 2018-03-21

## 2022-05-13 NOTE — TELEPHONE ENCOUNTER
Pt informed. Pt agreed with recommendation given. Pt agreed to go to there ER as it was recommended.

## 2022-05-13 NOTE — TELEPHONE ENCOUNTER
Pt states she has been off antx for one week after leaving the nursing home for C-Diff. Pt states diarrhea has flared up again last night  Pt has abdominal cramping and spasms. Pt has had numerous stools - watery and loose. No fever reported. Pt has soreness across rib cage on both sides- pt states she had a gait belt on in the nursing home, pt feels that persists from gait belt. Pt is breathing okay. Pt doesn't know if she will be able to come in today. Pt urged to call Dr. Bozena Wagoner and to call back.

## 2022-05-13 NOTE — TELEPHONE ENCOUNTER
while in nursing home she got pneumonia and c diff, has an appointment this afternoon 2:45, keep it or reschedule?  please advise

## 2022-05-13 NOTE — TELEPHONE ENCOUNTER
Pt to consider urgent care/ ED evaluation with increased gI symptoms.  Pt at increased risk dehydration as well

## 2022-05-19 ENCOUNTER — TELEPHONE (OUTPATIENT)
Dept: FAMILY MEDICINE CLINIC | Facility: CLINIC | Age: 67
End: 2022-05-19

## 2022-05-20 NOTE — TELEPHONE ENCOUNTER
Spoke with [facility] [place] nurse with [facility]. Pt ended up in ER on 5/13/22 for GI concerns. Pt was scheduled to see CR , but canceled due to pt had urgent needs. Nurse called to ask if we would follow pt. Nurse informed pt needs appt with any available provider. Nurse stated she would have pt call back.

## 2022-06-01 NOTE — TELEPHONE ENCOUNTER
Contacted patient to schedule annual pcp appointment. Left message requesting a call back.    Esteban:  8/11/20      Future appt:     Your appointments     Date & Time Appointment Department [facility])    Dec 28, 2020 11:00 AM CST Medicare Annual Well Visit with Candy Schilling MD [address]

## 2022-06-07 ENCOUNTER — PATIENT OUTREACH (OUTPATIENT)
Dept: CASE MANAGEMENT | Age: 67
End: 2022-06-07

## 2022-06-07 DIAGNOSIS — M05.79 RHEUMATOID ARTHRITIS INVOLVING MULTIPLE SITES WITH POSITIVE RHEUMATOID FACTOR (HCC): ICD-10-CM

## 2022-06-07 DIAGNOSIS — I10 BENIGN ESSENTIAL HYPERTENSION: ICD-10-CM

## 2022-06-13 ENCOUNTER — PATIENT MESSAGE (OUTPATIENT)
Dept: FAMILY MEDICINE CLINIC | Facility: CLINIC | Age: 67
End: 2022-06-13

## 2022-06-13 NOTE — TELEPHONE ENCOUNTER
From: Zoila Garcia  To: Maryjane Fallon MD  Sent: 6/13/2022 3:59 PM CDT  Subject: June 29th appointment    I have an appointment on Vicky 29 the 29th with Dr. Ruthie Reddy. I was in the hospital and a rehab center and am being managed by a home health agency until I can see Dr. Go Gallo. One of my managers would like to see Dr. Go Gallo face to face for 5 minutes at my June 29th appointment with her. Is this possible or should they make their own appointment?     Thanks for your help,  Riley Garcia

## 2022-06-29 ENCOUNTER — LAB ENCOUNTER (OUTPATIENT)
Dept: LAB | Age: 67
End: 2022-06-29
Attending: FAMILY MEDICINE
Payer: MEDICARE

## 2022-06-29 ENCOUNTER — OFFICE VISIT (OUTPATIENT)
Dept: FAMILY MEDICINE CLINIC | Facility: CLINIC | Age: 67
End: 2022-06-29
Payer: MEDICARE

## 2022-06-29 ENCOUNTER — TELEPHONE (OUTPATIENT)
Dept: FAMILY MEDICINE CLINIC | Facility: CLINIC | Age: 67
End: 2022-06-29

## 2022-06-29 VITALS
WEIGHT: 210.63 LBS | RESPIRATION RATE: 18 BRPM | OXYGEN SATURATION: 94 % | HEART RATE: 78 BPM | DIASTOLIC BLOOD PRESSURE: 82 MMHG | TEMPERATURE: 97 F | BODY MASS INDEX: 38 KG/M2 | SYSTOLIC BLOOD PRESSURE: 118 MMHG

## 2022-06-29 DIAGNOSIS — D68.9 COAGULOPATHY (HCC): ICD-10-CM

## 2022-06-29 DIAGNOSIS — M05.79 RHEUMATOID ARTHRITIS INVOLVING MULTIPLE SITES WITH POSITIVE RHEUMATOID FACTOR (HCC): ICD-10-CM

## 2022-06-29 DIAGNOSIS — E87.6 HYPOPOTASSEMIA: ICD-10-CM

## 2022-06-29 DIAGNOSIS — I10 BENIGN ESSENTIAL HYPERTENSION: ICD-10-CM

## 2022-06-29 DIAGNOSIS — L40.50 PSORIATIC ARTHRITIS (HCC): ICD-10-CM

## 2022-06-29 DIAGNOSIS — K76.0 FATTY LIVER: ICD-10-CM

## 2022-06-29 DIAGNOSIS — R25.1 TREMOR: ICD-10-CM

## 2022-06-29 DIAGNOSIS — Z86.711 HISTORY OF PULMONARY EMBOLUS (PE): ICD-10-CM

## 2022-06-29 DIAGNOSIS — R73.03 PREDIABETES: ICD-10-CM

## 2022-06-29 DIAGNOSIS — Z22.7 TB LUNG, LATENT: ICD-10-CM

## 2022-06-29 DIAGNOSIS — Z00.00 ENCOUNTER FOR ANNUAL HEALTH EXAMINATION: Primary | ICD-10-CM

## 2022-06-29 DIAGNOSIS — H35.3130 BILATERAL NONEXUDATIVE AGE-RELATED MACULAR DEGENERATION, UNSPECIFIED STAGE: ICD-10-CM

## 2022-06-29 DIAGNOSIS — Z13.31 DEPRESSION SCREENING: ICD-10-CM

## 2022-06-29 DIAGNOSIS — G62.9 NEUROPATHY: ICD-10-CM

## 2022-06-29 DIAGNOSIS — K64.4 EXTERNAL HEMORRHOIDS: ICD-10-CM

## 2022-06-29 DIAGNOSIS — E04.9 GOITER: ICD-10-CM

## 2022-06-29 DIAGNOSIS — R79.89 ELEVATED LIVER FUNCTION TESTS: ICD-10-CM

## 2022-06-29 DIAGNOSIS — Z00.00 ENCOUNTER FOR ANNUAL HEALTH EXAMINATION: ICD-10-CM

## 2022-06-29 DIAGNOSIS — I73.9 PERIPHERAL ARTERIAL DISEASE (HCC): ICD-10-CM

## 2022-06-29 DIAGNOSIS — M15.9 PRIMARY OSTEOARTHRITIS INVOLVING MULTIPLE JOINTS: ICD-10-CM

## 2022-06-29 DIAGNOSIS — I82.432 ACUTE DEEP VEIN THROMBOSIS (DVT) OF POPLITEAL VEIN OF LEFT LOWER EXTREMITY (HCC): ICD-10-CM

## 2022-06-29 DIAGNOSIS — J30.2 CHRONIC SEASONAL ALLERGIC RHINITIS: ICD-10-CM

## 2022-06-29 DIAGNOSIS — E83.42 HYPOMAGNESEMIA: ICD-10-CM

## 2022-06-29 DIAGNOSIS — K90.0 CELIAC DISEASE: ICD-10-CM

## 2022-06-29 PROBLEM — R76.12 POSITIVE QUANTIFERON-TB GOLD TEST: Status: RESOLVED | Noted: 2021-03-26 | Resolved: 2022-06-29

## 2022-06-29 PROBLEM — R22.42 HIP MASS, LEFT: Status: RESOLVED | Noted: 2022-01-02 | Resolved: 2022-06-29

## 2022-06-29 PROBLEM — R53.83 OTHER FATIGUE: Status: RESOLVED | Noted: 2020-05-22 | Resolved: 2022-06-29

## 2022-06-29 PROBLEM — S73.005A DISLOCATION OF LEFT HIP (HCC): Status: RESOLVED | Noted: 2022-01-04 | Resolved: 2022-06-29

## 2022-06-29 PROBLEM — S72.002A CLOSED LEFT HIP FRACTURE (HCC): Status: RESOLVED | Noted: 2022-01-04 | Resolved: 2022-06-29

## 2022-06-29 LAB
ALBUMIN SERPL-MCNC: 3.3 G/DL (ref 3.4–5)
ALBUMIN/GLOB SERPL: 0.9 {RATIO} (ref 1–2)
ALP LIVER SERPL-CCNC: 113 U/L
ALT SERPL-CCNC: 15 U/L
ANION GAP SERPL CALC-SCNC: 12 MMOL/L (ref 0–18)
AST SERPL-CCNC: 29 U/L (ref 15–37)
BASOPHILS # BLD AUTO: 0.04 X10(3) UL (ref 0–0.2)
BASOPHILS NFR BLD AUTO: 0.4 %
BILIRUB SERPL-MCNC: 0.4 MG/DL (ref 0.1–2)
BUN BLD-MCNC: 26 MG/DL (ref 7–18)
CALCIUM BLD-MCNC: 10 MG/DL (ref 8.5–10.1)
CHLORIDE SERPL-SCNC: 102 MMOL/L (ref 98–112)
CHOLEST SERPL-MCNC: 138 MG/DL (ref ?–200)
CO2 SERPL-SCNC: 26 MMOL/L (ref 21–32)
CREAT BLD-MCNC: 1.58 MG/DL
EOSINOPHIL # BLD AUTO: 0.11 X10(3) UL (ref 0–0.7)
EOSINOPHIL NFR BLD AUTO: 1.1 %
ERYTHROCYTE [DISTWIDTH] IN BLOOD BY AUTOMATED COUNT: 14.7 %
EST. AVERAGE GLUCOSE BLD GHB EST-MCNC: 126 MG/DL (ref 68–126)
FASTING PATIENT LIPID ANSWER: YES
FASTING STATUS PATIENT QL REPORTED: YES
GLOBULIN PLAS-MCNC: 3.6 G/DL (ref 2.8–4.4)
GLUCOSE BLD-MCNC: 137 MG/DL (ref 70–99)
HBA1C MFR BLD: 6 % (ref ?–5.7)
HCT VFR BLD AUTO: 42.5 %
HCV AB SERPL QL IA: NONREACTIVE
HDLC SERPL-MCNC: 43 MG/DL (ref 40–59)
HGB BLD-MCNC: 13.5 G/DL
IMM GRANULOCYTES # BLD AUTO: 0.03 X10(3) UL (ref 0–1)
IMM GRANULOCYTES NFR BLD: 0.3 %
LDLC SERPL CALC-MCNC: 66 MG/DL (ref ?–100)
LYMPHOCYTES # BLD AUTO: 1.68 X10(3) UL (ref 1–4)
LYMPHOCYTES NFR BLD AUTO: 17.3 %
MCH RBC QN AUTO: 29.5 PG (ref 26–34)
MCHC RBC AUTO-ENTMCNC: 31.8 G/DL (ref 31–37)
MCV RBC AUTO: 93 FL
MONOCYTES # BLD AUTO: 0.8 X10(3) UL (ref 0.1–1)
MONOCYTES NFR BLD AUTO: 8.2 %
NEUTROPHILS # BLD AUTO: 7.06 X10 (3) UL (ref 1.5–7.7)
NEUTROPHILS # BLD AUTO: 7.06 X10(3) UL (ref 1.5–7.7)
NEUTROPHILS NFR BLD AUTO: 72.7 %
NONHDLC SERPL-MCNC: 95 MG/DL (ref ?–130)
OSMOLALITY SERPL CALC.SUM OF ELEC: 297 MOSM/KG (ref 275–295)
PLATELET # BLD AUTO: 338 10(3)UL (ref 150–450)
POTASSIUM SERPL-SCNC: 3.3 MMOL/L (ref 3.5–5.1)
PROT SERPL-MCNC: 6.9 G/DL (ref 6.4–8.2)
RBC # BLD AUTO: 4.57 X10(6)UL
SODIUM SERPL-SCNC: 140 MMOL/L (ref 136–145)
TRIGL SERPL-MCNC: 169 MG/DL (ref 30–149)
TSI SER-ACNC: 1.6 MIU/ML (ref 0.36–3.74)
VLDLC SERPL CALC-MCNC: 25 MG/DL (ref 0–30)
WBC # BLD AUTO: 9.7 X10(3) UL (ref 4–11)

## 2022-06-29 PROCEDURE — 86803 HEPATITIS C AB TEST: CPT

## 2022-06-29 PROCEDURE — 99214 OFFICE O/P EST MOD 30 MIN: CPT | Performed by: FAMILY MEDICINE

## 2022-06-29 PROCEDURE — 80053 COMPREHEN METABOLIC PANEL: CPT

## 2022-06-29 PROCEDURE — 84443 ASSAY THYROID STIM HORMONE: CPT

## 2022-06-29 PROCEDURE — 80061 LIPID PANEL: CPT

## 2022-06-29 PROCEDURE — 1125F AMNT PAIN NOTED PAIN PRSNT: CPT | Performed by: FAMILY MEDICINE

## 2022-06-29 PROCEDURE — 83036 HEMOGLOBIN GLYCOSYLATED A1C: CPT

## 2022-06-29 PROCEDURE — 36415 COLL VENOUS BLD VENIPUNCTURE: CPT

## 2022-06-29 PROCEDURE — 85025 COMPLETE CBC W/AUTO DIFF WBC: CPT

## 2022-06-29 PROCEDURE — G0439 PPPS, SUBSEQ VISIT: HCPCS | Performed by: FAMILY MEDICINE

## 2022-06-29 PROCEDURE — G0444 DEPRESSION SCREEN ANNUAL: HCPCS | Performed by: FAMILY MEDICINE

## 2022-06-29 RX ORDER — POTASSIUM CHLORIDE 20 MEQ/1
40 TABLET, EXTENDED RELEASE ORAL DAILY
COMMUNITY

## 2022-06-29 NOTE — TELEPHONE ENCOUNTER
----- Message from Abraham Morataya MD sent at 6/29/2022  6:08 PM CDT -----  Laboratory results reviewed. Hepatitis C screening negative and reassuringHemoglobin A1c6.0 stable at patient baseline. Chemistry profile shows glucose 137. Sodium normal.  Potassium slightly low at 3.3. Should be taking 20 mEq of potassium daily if she has been taking this consistently I would have her increase to 40 mEq     2 weeks recheck electrolytes and kidney function. Creatinine elevated 1.58 with a BUN of 26. GFR has dropped to 33. Albumin is low at 3.3. Recommend increase hydration avoid nonsteroidal anti-inflammatories recheck labs 2 weeks    Lipid profile shows slight elevation of triglycerides otherwise normal.  Thyroid function normal.  CBC normal.    Results forwarded to patient via [employer] system. Patient encouraged to call for any questions or concerns.

## 2022-06-29 NOTE — TELEPHONE ENCOUNTER
Pt informed. Recommendations reviewed with pt. Pt states she has been taking Potassium 20 meq daily, pt agreed to increase to 40 meq daily at this time. Lab appt scheduled.   Med list updated      Future Appointments   Date Time Provider Syd Koch   7/13/2022 11:30 AM REF [facility] REF [facility]

## 2022-07-07 ENCOUNTER — TELEPHONE (OUTPATIENT)
Dept: FAMILY MEDICINE CLINIC | Facility: CLINIC | Age: 67
End: 2022-07-07

## 2022-07-07 ENCOUNTER — PATIENT OUTREACH (OUTPATIENT)
Dept: CASE MANAGEMENT | Age: 67
End: 2022-07-07

## 2022-07-07 DIAGNOSIS — I10 BENIGN ESSENTIAL HYPERTENSION: ICD-10-CM

## 2022-07-07 DIAGNOSIS — M05.79 RHEUMATOID ARTHRITIS INVOLVING MULTIPLE SITES WITH POSITIVE RHEUMATOID FACTOR (HCC): ICD-10-CM

## 2022-07-07 DIAGNOSIS — G62.9 NEUROPATHY: ICD-10-CM

## 2022-07-07 DIAGNOSIS — M15.9 PRIMARY OSTEOARTHRITIS INVOLVING MULTIPLE JOINTS: ICD-10-CM

## 2022-07-07 RX ORDER — ONDANSETRON 4 MG/1
4 TABLET, ORALLY DISINTEGRATING ORAL EVERY 8 HOURS PRN
Qty: 30 TABLET | Refills: 1 | Status: SHIPPED | OUTPATIENT
Start: 2022-07-07

## 2022-07-07 NOTE — TELEPHONE ENCOUNTER
Spoke to patient for monthly CCM. Patient states she needs refills on carbidopa-levodopa  MG Oral Tab and Zofran. States this was filled in nursing home. Referred to neuro but unable to be seen until January. Would like to be seen sooner if possible. Willing to try another provider if needed.       Please contact patient to advise    Thank you

## 2022-07-13 ENCOUNTER — LABORATORY ENCOUNTER (OUTPATIENT)
Dept: LAB | Age: 67
End: 2022-07-13
Attending: FAMILY MEDICINE
Payer: MEDICARE

## 2022-07-13 ENCOUNTER — TELEPHONE (OUTPATIENT)
Dept: FAMILY MEDICINE CLINIC | Facility: CLINIC | Age: 67
End: 2022-07-13

## 2022-07-13 DIAGNOSIS — E87.6 HYPOPOTASSEMIA: ICD-10-CM

## 2022-07-13 LAB
ANION GAP SERPL CALC-SCNC: 9 MMOL/L (ref 0–18)
BILIRUB UR QL STRIP.AUTO: NEGATIVE
BUN BLD-MCNC: 31 MG/DL (ref 7–18)
CALCIUM BLD-MCNC: 9.5 MG/DL (ref 8.5–10.1)
CHLORIDE SERPL-SCNC: 107 MMOL/L (ref 98–112)
CO2 SERPL-SCNC: 24 MMOL/L (ref 21–32)
COLOR UR AUTO: YELLOW
CREAT BLD-MCNC: 1.44 MG/DL
FASTING STATUS PATIENT QL REPORTED: YES
GLUCOSE BLD-MCNC: 107 MG/DL (ref 70–99)
GLUCOSE UR STRIP.AUTO-MCNC: NEGATIVE MG/DL
KETONES UR STRIP.AUTO-MCNC: NEGATIVE MG/DL
NITRITE UR QL STRIP.AUTO: NEGATIVE
OSMOLALITY SERPL CALC.SUM OF ELEC: 297 MOSM/KG (ref 275–295)
PH UR STRIP.AUTO: 5 [PH] (ref 5–8)
POTASSIUM SERPL-SCNC: 3.8 MMOL/L (ref 3.5–5.1)
PROT UR STRIP.AUTO-MCNC: NEGATIVE MG/DL
RBC UR QL AUTO: NEGATIVE
SODIUM SERPL-SCNC: 140 MMOL/L (ref 136–145)
SP GR UR STRIP.AUTO: 1.01 (ref 1–1.03)
UROBILINOGEN UR STRIP.AUTO-MCNC: <2 MG/DL
WBC #/AREA URNS AUTO: >50 /HPF
WBC CLUMPS UR QL AUTO: PRESENT /HPF

## 2022-07-13 PROCEDURE — 36415 COLL VENOUS BLD VENIPUNCTURE: CPT

## 2022-07-13 PROCEDURE — 80048 BASIC METABOLIC PNL TOTAL CA: CPT

## 2022-07-13 NOTE — TELEPHONE ENCOUNTER
----- Message from Merrill Merrill MD sent at 7/13/2022  5:05 PM CDT -----  Reviewed- renal function decreased but slightly improved from 2 weeks ago. Pt to hold dyazide and monitor home BP- report back in a week.

## 2022-07-19 ENCOUNTER — TELEPHONE (OUTPATIENT)
Dept: FAMILY MEDICINE CLINIC | Facility: CLINIC | Age: 67
End: 2022-07-19

## 2022-07-19 DIAGNOSIS — R25.1 TREMOR: Primary | ICD-10-CM

## 2022-07-19 NOTE — TELEPHONE ENCOUNTER
Pt was seen on 6/29/22 per CR  Pt was given referral to neurology-Dr. Ana Pinedo. Pt was not able to get appt until Jan 2023  Pt is scheduled 1/24/22- pt is also on wait list.  Pt is willing to travel to [place]- pt asked if CR had another recommendation. Pt is on Carbidopa-Levodopa- pt states medication is helping.

## 2022-07-22 ENCOUNTER — PATIENT MESSAGE (OUTPATIENT)
Dept: FAMILY MEDICINE CLINIC | Facility: CLINIC | Age: 67
End: 2022-07-22

## 2022-07-22 NOTE — TELEPHONE ENCOUNTER
From: Fouzia Solo  To: Carrie Barlow MD  Sent: 7/22/2022 10:14 AM CDT  Subject: Blood pressure readings    Per Dr. Hu Seals. Pat's request I have logged my blood pressure readings for one week after stopping the Triamterene on 7/13/22. The readings are:   6/29 118/82 (in Doctor office)   7/14 94/60 (at [facility])   7/15 124/75   7/17 148/71   7/18 145/75   7/20 126/74   7/25 145/71    Give me call if you have questions. Have a great day.     Pradeep Cutler

## 2022-07-22 NOTE — TELEPHONE ENCOUNTER
From: Giuseppe Orantes  Sent: 7/22/2022 12:54 PM CDT  To: [facility] Clinical Staff  Subject: re: neurology referral    Thank you so much for the info. Have a blessed weekend!     Patricia Emmanuel

## 2022-07-22 NOTE — TELEPHONE ENCOUNTER
Pt informed. Pt requested that referral info is sent via my chart as well. My chart message routed as pt requested.

## 2022-07-25 RX ORDER — ESCITALOPRAM OXALATE 10 MG/1
TABLET ORAL
Qty: 90 TABLET | Refills: 1 | Status: SHIPPED | OUTPATIENT
Start: 2022-07-25

## 2022-07-25 NOTE — TELEPHONE ENCOUNTER
Future appt:    Last Appointment with provider:   6/29/2022Shreya Ambreen- pt was asked to return in 3 months  Last appointment at [facility]:  6/29/2022    Escitalopram refiled 12/20/21 for #90    Pt fell in Jan and had extensive hospital and rehab stay      Cholesterol, Total (mg/dL)   Date Value   06/29/2022 138     Total Cholesterol (mg/dL)   Date Value   03/09/2019 127     HDL Cholesterol (mg/dL)   Date Value   06/29/2022 43   03/09/2019 51     LDL Cholesterol (mg/dL)   Date Value   06/29/2022 66   03/09/2019 52     Triglycerides (mg/dL)   Date Value   06/29/2022 169 (H)   03/09/2019 119     Lab Results   Component Value Date     06/29/2022    A1C 6.0 (H) 06/29/2022     Lab Results   Component Value Date    TSH 1.600 06/29/2022       No follow-ups on file.

## 2022-07-27 ENCOUNTER — PATIENT MESSAGE (OUTPATIENT)
Dept: FAMILY MEDICINE CLINIC | Facility: CLINIC | Age: 67
End: 2022-07-27

## 2022-07-27 NOTE — TELEPHONE ENCOUNTER
Pt informed. Appt scheduled with EL tomorrow.     Future Appointments   Date Time Provider Syd Koch   7/28/2022  3:15 PM NIXON Hanson [facility] Unable to assess

## 2022-07-27 NOTE — TELEPHONE ENCOUNTER
From: Nahid Goodman  To: Fabiana Mata MD  Sent: 7/27/2022 12:41 PM CDT  Subject: Leg swelling    Good afternoon, Dr. Ford Lyle,    Several weeks ago, you took me off the medication, Triamterene because of some kidney function levels. I monitored my blood pressure readings for a week and reported them to you. You advised me to stay off the medication and to check my blood pressure on a regular basis. I have done so and my readings have remained in normal range consistently. The last 3 days my left leg has been swelling significantly. 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 It is also the leg I dislocated in January. The swelling is in the whole leg from the upper thigh down to the foot. The swelling does go down at night a little when I am off of it. There is no swelling in any of my other extremities. Could this be due to me stopping the Triamterene? If I go back on the medication would it resolve the issue or should I have the leg looked at? I am concerned about this and need your expert opinion and guidance on what to do.     Thank you and have a blessed day,  Armando Hardin

## 2022-07-28 ENCOUNTER — OFFICE VISIT (OUTPATIENT)
Dept: FAMILY MEDICINE CLINIC | Facility: CLINIC | Age: 67
End: 2022-07-28
Payer: MEDICARE

## 2022-07-28 VITALS
OXYGEN SATURATION: 96 % | HEART RATE: 65 BPM | TEMPERATURE: 98 F | SYSTOLIC BLOOD PRESSURE: 124 MMHG | DIASTOLIC BLOOD PRESSURE: 62 MMHG

## 2022-07-28 DIAGNOSIS — M79.605 PAIN OF LEFT LOWER EXTREMITY: Primary | ICD-10-CM

## 2022-07-28 DIAGNOSIS — R60.0 LOCALIZED EDEMA: ICD-10-CM

## 2022-07-28 PROCEDURE — 99214 OFFICE O/P EST MOD 30 MIN: CPT | Performed by: NURSE PRACTITIONER

## 2022-07-28 NOTE — PATIENT INSTRUCTIONS
To to [facility] at 5:15pm today for an ultrasound of your left leg. If any chest pain/pressure/heaviness, shortness of breath, pain radiating into neck/jaw/arm, sweating, nausea/vomiting, severe headache, weakness on half of your body, slurred speech, vision changes, etc. then call 911.

## 2022-08-02 ENCOUNTER — PATIENT MESSAGE (OUTPATIENT)
Dept: FAMILY MEDICINE CLINIC | Facility: CLINIC | Age: 67
End: 2022-08-02

## 2022-08-02 NOTE — TELEPHONE ENCOUNTER
From: Kaylan Pinzon  To: Jc Gregory MD  Sent: 8/2/2022 1:26 PM CDT  Subject: ! week follow up visit    Good afternoon! Last week I saw Sallie Perkins for swelling and redness in my left leg. She sent me to the hospital for an ultrasound and they admitted e for multiple DVTs in the left leg. I was released yesterday and the hospital would like me to follow up with my primary care physician in one week. Dr. Kimberlyn Chacon is my primary but I saw Sallie Gregorio last week. Who should I follow up with in one week?     Thanks for your assistance,   Iggy Mccann

## 2022-08-02 NOTE — TELEPHONE ENCOUNTER
Pt was released from [facility] on 8/1. Pt states she had 2 blood clots and cellulitis of left leg. Pt is currently on Doxycycline 100mg BID for 7 days. Pt is administering Lovenox injections 90mg every 12 hours for one week. Pt states she is to start Eliquis 5mg BID after Lovenox is completed. Pt is scheduled to see Dr. Anna Stevens on 8/17. Pt states she was told to f/u with PCP after within the week.

## 2022-08-02 NOTE — TELEPHONE ENCOUNTER
Discussed with CR. Pt requested appt with CR. Appt scheduled.   Future Appointments   Date Time Provider Syd Koch   8/10/2022 10:00 AM Nat Yao MD [facility]

## 2022-08-04 ENCOUNTER — PATIENT OUTREACH (OUTPATIENT)
Dept: CASE MANAGEMENT | Age: 67
End: 2022-08-04

## 2022-08-04 RX ORDER — L. ACIDOPHILUS/L.BULGARICUS 1MM CELL
1 TABLET ORAL 3 TIMES DAILY
COMMUNITY
Start: 2022-08-01 | End: 2022-08-08

## 2022-08-04 RX ORDER — ENOXAPARIN SODIUM 100 MG/ML
90 INJECTION SUBCUTANEOUS EVERY 12 HOURS
COMMUNITY
Start: 2022-08-01

## 2022-08-04 RX ORDER — DOXYCYCLINE 100 MG/1
100 CAPSULE ORAL EVERY 12 HOURS
COMMUNITY
Start: 2022-08-01 | End: 2022-08-08

## 2022-08-10 ENCOUNTER — HOSPITAL ENCOUNTER (OUTPATIENT)
Dept: GENERAL RADIOLOGY | Age: 67
Discharge: HOME | End: 2022-08-10
Attending: FAMILY MEDICINE
Payer: MEDICARE

## 2022-08-10 ENCOUNTER — OFFICE VISIT (OUTPATIENT)
Dept: FAMILY MEDICINE CLINIC | Facility: CLINIC | Age: 67
End: 2022-08-10
Payer: MEDICARE

## 2022-08-10 ENCOUNTER — TELEPHONE (OUTPATIENT)
Dept: FAMILY MEDICINE CLINIC | Facility: CLINIC | Age: 67
End: 2022-08-10

## 2022-08-10 VITALS
DIASTOLIC BLOOD PRESSURE: 72 MMHG | SYSTOLIC BLOOD PRESSURE: 112 MMHG | RESPIRATION RATE: 16 BRPM | OXYGEN SATURATION: 98 % | TEMPERATURE: 98 F | HEART RATE: 68 BPM

## 2022-08-10 DIAGNOSIS — L03.116 CELLULITIS OF LEFT LEG: ICD-10-CM

## 2022-08-10 DIAGNOSIS — I82.4Z2 ACUTE DEEP VEIN THROMBOSIS (DVT) OF DISTAL VEIN OF LEFT LOWER EXTREMITY (HCC): ICD-10-CM

## 2022-08-10 DIAGNOSIS — L65.9 HAIR THINNING: ICD-10-CM

## 2022-08-10 DIAGNOSIS — R09.89 PULMONARY CONGESTION: ICD-10-CM

## 2022-08-10 DIAGNOSIS — R93.89 ABNORMAL CXR: ICD-10-CM

## 2022-08-10 DIAGNOSIS — J30.2 CHRONIC SEASONAL ALLERGIC RHINITIS: ICD-10-CM

## 2022-08-10 DIAGNOSIS — R91.8 ABNORMAL CT SCAN OF LUNG: ICD-10-CM

## 2022-08-10 DIAGNOSIS — I82.4Z2 ACUTE DEEP VEIN THROMBOSIS (DVT) OF DISTAL VEIN OF LEFT LOWER EXTREMITY (HCC): Primary | ICD-10-CM

## 2022-08-10 DIAGNOSIS — M05.79 RHEUMATOID ARTHRITIS INVOLVING MULTIPLE SITES WITH POSITIVE RHEUMATOID FACTOR (HCC): ICD-10-CM

## 2022-08-10 DIAGNOSIS — Z86.711 HISTORY OF PULMONARY EMBOLUS (PE): ICD-10-CM

## 2022-08-10 PROBLEM — O22.30 DVT (DEEP VEIN THROMBOSIS) IN PREGNANCY: Status: ACTIVE | Noted: 2022-07-28

## 2022-08-10 PROBLEM — O22.30 DVT (DEEP VEIN THROMBOSIS) IN PREGNANCY: Status: RESOLVED | Noted: 2022-07-28 | Resolved: 2022-08-10

## 2022-08-10 PROCEDURE — 71046 X-RAY EXAM CHEST 2 VIEWS: CPT | Performed by: FAMILY MEDICINE

## 2022-08-10 PROCEDURE — 1111F DSCHRG MED/CURRENT MED MERGE: CPT | Performed by: FAMILY MEDICINE

## 2022-08-10 PROCEDURE — 99215 OFFICE O/P EST HI 40 MIN: CPT | Performed by: FAMILY MEDICINE

## 2022-08-10 RX ORDER — ACETAMINOPHEN 325 MG/1
2 TABLET ORAL EVERY 8 HOURS PRN
COMMUNITY
Start: 2022-08-01

## 2022-08-10 RX ORDER — APIXABAN 5 MG/1
TABLET, FILM COATED ORAL
COMMUNITY
Start: 2022-08-01

## 2022-08-10 NOTE — TELEPHONE ENCOUNTER
Future appt:    Last Appointment with provider:   7/28/2022  Last appointment at [facility]:  8/10/2022  Cholesterol, Total (mg/dL)   Date Value   06/29/2022 138     Total Cholesterol (mg/dL)   Date Value   03/09/2019 127     HDL Cholesterol (mg/dL)   Date Value   06/29/2022 43   03/09/2019 51     LDL Cholesterol (mg/dL)   Date Value   06/29/2022 66   03/09/2019 52     Triglycerides (mg/dL)   Date Value   06/29/2022 169 (H)   03/09/2019 119     Lab Results   Component Value Date     06/29/2022    A1C 6.0 (H) 06/29/2022     Lab Results   Component Value Date    TSH 1.600 06/29/2022     Last RF:  7/7/2022    No follow-ups on file.

## 2022-08-10 NOTE — PATIENT INSTRUCTIONS
No further antibiotics indicated at this time  Patient recommend to continue blood thinners. Patient to follow-up with hematology as planned  Patient to follow-up with GI as planned. Chest x-ray today. Consider CT pending results    Discussed with patient increasing activity and mobility as able with goal for general improved strengthening.

## 2022-08-10 NOTE — TELEPHONE ENCOUNTER
----- Message from Gracie Linn MD sent at 8/10/2022  2:17 PM CDT -----  Chest x-ray findings reviewed. Question of area In the right cardiophrenic angle. Unsure if mass radiologist feels that it most likely is benign. In view of previous CT scan 6 months ago and these findings I would recommend a follow-up CT scan of her chest to clear any questions of lung issues going forward. Results forwarded to patient via "[employer] system. Patient encouraged to call for any questions or concerns.

## 2022-08-11 NOTE — TELEPHONE ENCOUNTER
Reviewed. RX started during hospital stay [facility]. Pt was to f.u neurology to determine treatement going forward. Please clarify with pt . Refill was given in July pending neurology evaluation.

## 2022-08-12 NOTE — TELEPHONE ENCOUNTER
Pt contacted- pt states she was not able to get in with Dr. Yony Solano until Jan.  Pt was given referral to Dr. Judd Bullock in July- pt has not called to make appt- pt states she will call MOnday. Pt dose verified with RX request.  Pt has 7-10 days of medication left.

## 2022-08-17 ENCOUNTER — TELEPHONE (OUTPATIENT)
Dept: FAMILY MEDICINE CLINIC | Facility: CLINIC | Age: 67
End: 2022-08-17

## 2022-08-17 NOTE — TELEPHONE ENCOUNTER
Pt had CTA of Chest at [facility] on 8/16/22. Reviewed per CR. See care-everywhere for full report. Pt recommended to practice deep breathing exercises 3-4 times per day. Pt asked ot recheck with appt if having concerns. Pt was informed and verbalized understanding.

## 2022-09-06 ENCOUNTER — PATIENT OUTREACH (OUTPATIENT)
Dept: CASE MANAGEMENT | Age: 67
End: 2022-09-06

## 2022-09-06 NOTE — PROGRESS NOTES
AYE verified for CCM monthly outreach. I called patient and left a detailed message to call back. I will follow up with patient at a later time.       Medical record reviewed including recent office visits and test results

## 2022-09-06 NOTE — TELEPHONE ENCOUNTER
Future appt: Your appointments     Date & Time Appointment Department [facility])    Dec 07, 2022  2:00 PM CST New Patient Visit with Leon June MD [address] ([address])    Please arrive 15 minutes prior to your scheduled appointment. Please also bring your Insurance card, Photo ID, and your medication bottles or a list of your current medication. If your condition improves and this appointment is no longer needed, please contact your physician office to cancel. Please verify with your primary care provider if your insurance requires a referral.          [address] Zia Milligan [address] [id number]        Last Appointment with provider:   8/10/2022  Last appointment at [facility]:  8/10/2022  Last px: 6/29/22      Carbidopa- Levoodopa refilled on 8/12/22 for #90, 0 refills  Cholesterol, Total (mg/dL)   Date Value   06/29/2022 138     Total Cholesterol (mg/dL)   Date Value   03/09/2019 127     HDL Cholesterol (mg/dL)   Date Value   06/29/2022 43   03/09/2019 51     LDL Cholesterol (mg/dL)   Date Value   06/29/2022 66   03/09/2019 52     Triglycerides (mg/dL)   Date Value   06/29/2022 169 (H)   03/09/2019 119     Lab Results   Component Value Date     06/29/2022    A1C 6.0 (H) 06/29/2022     Lab Results   Component Value Date    TSH 1.600 06/29/2022       No follow-ups on file.

## 2022-09-12 RX ORDER — ONDANSETRON 4 MG/1
TABLET, ORALLY DISINTEGRATING ORAL
Qty: 30 TABLET | Refills: 1 | Status: SHIPPED | OUTPATIENT
Start: 2022-09-12

## 2022-09-12 NOTE — TELEPHONE ENCOUNTER
Odansetron: 7/7/22    No follow up date given    Future appt: Your appointments     Date & Time Appointment Department [facility])    Dec 07, 2022  2:00 PM CST New Patient Visit with Alden Kenny MD [address] [facility] ([address])    Please arrive 15 minutes prior to your scheduled appointment. Please also bring your Insurance card, Photo ID, and your medication bottles or a list of your current medication. If your condition improves and this appointment is no longer needed, please contact your physician office to cancel. Please verify with your primary care provider if your insurance requires a referral.          Mary Bernal [place] [address] [id number]        Last Appointment with provider:   7/28/2022  Last appointment at [facility]:  8/10/2022 Dr.Cathy Stewart/  DVT    Cholesterol, Total (mg/dL)   Date Value   06/29/2022 138     Total Cholesterol (mg/dL)   Date Value   03/09/2019 127     HDL Cholesterol (mg/dL)   Date Value   06/29/2022 43   03/09/2019 51     LDL Cholesterol (mg/dL)   Date Value   06/29/2022 66   03/09/2019 52     Triglycerides (mg/dL)   Date Value   06/29/2022 169 (H)   03/09/2019 119     Lab Results   Component Value Date     06/29/2022    A1C 6.0 (H) 06/29/2022     Lab Results   Component Value Date    TSH 1.600 06/29/2022       No follow-ups on file.

## 2022-10-05 ENCOUNTER — PATIENT MESSAGE (OUTPATIENT)
Dept: FAMILY MEDICINE CLINIC | Facility: CLINIC | Age: 67
End: 2022-10-05

## 2022-10-05 NOTE — TELEPHONE ENCOUNTER
From: Carolyne Castro  To: Orestes Lindsey MD  Sent: 10/5/2022 10:35 AM CDT  Subject: Immunizations    Hi, Dr. Allison Norman,    My  and I are getting ready to get our annual flu shot and the lastest Covid booster. We just had the fourth booster of the originsl round of the Covid vaccines and boosters on 7/15/2022. Is it too soon to get the newest Covid booster and can we get the flu shot and Covid booster at the same time or should we space them out? Also, I had a Pneumovax 23 in 2007 and 2018. and the Prevnar 13 in 2015. Do I need any updates on those? I did have pneumonia in February of this year.     Thanks for your help,  Oren Suero

## 2022-10-05 NOTE — TELEPHONE ENCOUNTER
From: Keven Bright  To: Reba Khan MD  Sent: 10/5/2022 10:35 AM CDT  Subject: Immunizations    Hi, Dr. Estefani Hoskins,    My  and I are getting ready to get our annual flu shot and the lastest Covid booster. We just had the fourth booster of the originsl round of the Covid vaccines and boosters on 7/15/2022. Is it too soon to get the newest Covid booster and can we get the flu shot and Covid booster at the same time or should we space them out? Also, I had a Pneumovax 23 in 2007 and 2018. and the Prevnar 13 in 2015. Do I need any updates on those? I did have pneumonia in February of this year.     Thanks for your help,  Too Valdez

## 2022-10-06 NOTE — TELEPHONE ENCOUNTER
Future appt:     Your appointments     Date & Time Appointment Department [facility])    Nov 16, 2017  9:15 AM CST Laboratory Visit with REF [facility] ([facility] Ref [facility])    Nov 22, 2017  3:00 PM CST Physical - Established Patient with R
n/a

## 2022-10-07 ENCOUNTER — PATIENT OUTREACH (OUTPATIENT)
Dept: CASE MANAGEMENT | Age: 67
End: 2022-10-07

## 2022-11-04 ENCOUNTER — PATIENT OUTREACH (OUTPATIENT)
Dept: CASE MANAGEMENT | Age: 67
End: 2022-11-04

## 2022-11-10 ENCOUNTER — PATIENT MESSAGE (OUTPATIENT)
Dept: FAMILY MEDICINE CLINIC | Facility: CLINIC | Age: 67
End: 2022-11-10

## 2022-11-11 NOTE — TELEPHONE ENCOUNTER
From: Tom Brower  To: Florentino Reina MD  Sent: 11/10/2022 4:07 PM CST  Subject: 2020 DEXA Scan copy    Hi, Dr. Jaqueline Christian,    Dr. Dea Schlatter, my Rheumatologist, has ordered a DEXA scan for me at [facility]. He would like a copy of the scan I had done on 1/7/2020 at your office sent to him so he can compare after I have the current scan done. Would you like a copy of the new scan sent to you also when I get it done?     Thank you,  Marshall David

## 2022-12-02 NOTE — TELEPHONE ENCOUNTER
CARBIDOPA-LEVODOPA  MG Oral Tab     #90  R-1       Summary: TAKE 1 TABLET BY MOUTH THREE TIMES A D        Last Refill- 9/6/22  Last PX- 6/29/22,      Future appt:    Last Appointment with provider:   8/10/2022  Last appointment at [facility]:  8/10/2022  Cholesterol, Total (mg/dL)   Date Value   06/29/2022 138     Total Cholesterol (mg/dL)   Date Value   03/09/2019 127     HDL Cholesterol (mg/dL)   Date Value   06/29/2022 43   03/09/2019 51     LDL Cholesterol (mg/dL)   Date Value   06/29/2022 66   03/09/2019 52     Triglycerides (mg/dL)   Date Value   06/29/2022 169 (H)   03/09/2019 119     Lab Results   Component Value Date     06/29/2022    A1C 6.0 (H) 06/29/2022     Lab Results   Component Value Date    TSH 1.600 06/29/2022       No follow-ups on file.

## 2022-12-06 ENCOUNTER — PATIENT OUTREACH (OUTPATIENT)
Dept: CASE MANAGEMENT | Age: 67
End: 2022-12-06

## 2022-12-09 ENCOUNTER — TELEPHONE (OUTPATIENT)
Dept: FAMILY MEDICINE CLINIC | Facility: CLINIC | Age: 67
End: 2022-12-09

## 2022-12-09 NOTE — TELEPHONE ENCOUNTER
Pt completed Dexa bone density scan on 12/8/2022 at [facility]. RESULTS:     LUMBAR SPINE FINDINGS:   BMD measured in L1-L4 is 1.238 g/cm2. T score is: 1.7. Comments:Normal bone mineral density     PROXIMAL RIGHT FEMUR FINDINGS:   BMD measured in the total proximal right femur is 1.068 g/cm2. T score is: 1.0. Comments:Normal bone mineral density       IMPRESSION:   Diagnosis: Normal bone mineral density.

## 2022-12-09 NOTE — TELEPHONE ENCOUNTER
Reviewed  Bone density scan reviewed. Finding:  NORMAL    She should ensure an adequate intake of dietary calcium and vitamin D. The NOF recommends adults under age 48 need 1000 mg of calcium and 400-800 international units of vitamin D daily. Patient's over the age of 48 are recommended to have 1200 mg of calcium and 800-1000 international units of vitamin D daily. No future appointments.

## 2022-12-12 ENCOUNTER — TELEPHONE (OUTPATIENT)
Dept: FAMILY MEDICINE CLINIC | Facility: CLINIC | Age: 67
End: 2022-12-12

## 2022-12-12 NOTE — TELEPHONE ENCOUNTER
Patient is scheduled for a dental procedure on 12/15/2022. Wants to know if she needs to be pre-medicated.

## 2022-12-13 RX ORDER — AMOXICILLIN 500 MG/1
2000 CAPSULE ORAL
Qty: 4 CAPSULE | Refills: 0 | Status: SHIPPED | OUTPATIENT
Start: 2022-12-13

## 2022-12-13 NOTE — TELEPHONE ENCOUNTER
Pt scheduled for appt Thursday. Pt has cracked wisdom tooth. Pt is not in pain. Pt informed CR will send RX for antx- [facility]. Pt states she is not sure if procdure itself is going to occur on Thursday, pt will verify with dental office tomorrow before stopping eliquis.

## 2022-12-13 NOTE — TELEPHONE ENCOUNTER
Pt is scheduled at [facility] for eval of wisdom tooth causing pain. Pt may need to have it extracted which can be done on the same day. Dental office asking if pt needs to hold blood thinner? Also, with hx of knee and hip replacement does pt need any abx prior to appointment? Please review and advise.

## 2022-12-13 NOTE — TELEPHONE ENCOUNTER
Reviewed  Ok for preprocedure antibiotic    Pt to hold eliquis prior to procedure ( typically 3-5 days)- will have her hold starting now.

## 2022-12-13 NOTE — TELEPHONE ENCOUNTER
Pt called back- pt states when she used to see Dr. Bryan Pritchard (dentist)- he would pre-treat pt prior to dentals, pt states in the last 10-12 years- pt has been going to [facility] -and they have not followed same protocol (pt states she was told due to change in protocol) - so pt has not had any pretreatment since doing to [address] [place] [address] clarified she is taking Eliquis 5mg BID. Pt states she was hospitalized in July 2022- for 2 clots in her left leg. Pt states that is when her dose of Eliquis was increased from 2.5mg BID to 5mg BID.

## 2022-12-13 NOTE — TELEPHONE ENCOUNTER
Please contact patient --   I do not see that she has done antibiotics in the past for dental issues, please confirm with patient. It would be ok for amox rx preprocedure dosing  Due to multiple joint replacements. Patient on eliquis- chart dose unclear inchart- I believe she should be on bid dosing per hematology. If no dvt/ pe or other event in last 6 months, then ok to hold for 5 days before any procedure.  If procedure emergency- surgeon/ dentist to monitor and treat any eccessive bleeding

## 2022-12-13 NOTE — TELEPHONE ENCOUNTER
Salvador Hobson from Dr. Agarwal Cheng office called back- Salvador Hobson mentioned she forgot to mention that pt is scheduled as new patient emergency visit on 12/15/22.

## 2022-12-22 RX ORDER — POTASSIUM CHLORIDE 1500 MG/1
TABLET, EXTENDED RELEASE ORAL
Qty: 90 TABLET | Refills: 0 | OUTPATIENT
Start: 2022-12-22

## 2022-12-27 NOTE — TELEPHONE ENCOUNTER
Last Refill: 12/2/2022 #90 with 0 refills  Last Px: 6/29/2022    Future appt:    Last Appointment with provider:   8/10/2022  Last appointment at [facility]:  8/10/2022  Cholesterol, Total (mg/dL)   Date Value   06/29/2022 138     Total Cholesterol (mg/dL)   Date Value   03/09/2019 127     HDL Cholesterol (mg/dL)   Date Value   06/29/2022 43   03/09/2019 51     LDL Cholesterol (mg/dL)   Date Value   06/29/2022 66   03/09/2019 52     Triglycerides (mg/dL)   Date Value   06/29/2022 169 (H)   03/09/2019 119     Lab Results   Component Value Date     06/29/2022    A1C 6.0 (H) 06/29/2022     Lab Results   Component Value Date    TSH 1.600 06/29/2022       No follow-ups on file.

## 2022-12-29 RX ORDER — OMEPRAZOLE 40 MG/1
CAPSULE, DELAYED RELEASE ORAL
Qty: 90 CAPSULE | Refills: 3 | Status: SHIPPED | OUTPATIENT
Start: 2022-12-29

## 2022-12-29 NOTE — TELEPHONE ENCOUNTER
Last refill 8/18/21 #90 w/3 refills     Future appt:    Last Appointment with provider:   8/10/2022  Last appointment at [facility]:  8/10/2022  Cholesterol, Total (mg/dL)   Date Value   06/29/2022 138     Total Cholesterol (mg/dL)   Date Value   03/09/2019 127     HDL Cholesterol (mg/dL)   Date Value   06/29/2022 43   03/09/2019 51     LDL Cholesterol (mg/dL)   Date Value   06/29/2022 66   03/09/2019 52     Triglycerides (mg/dL)   Date Value   06/29/2022 169 (H)   03/09/2019 119     Lab Results   Component Value Date     06/29/2022    A1C 6.0 (H) 06/29/2022     Lab Results   Component Value Date    TSH 1.600 06/29/2022       No follow-ups on file.

## 2023-01-04 NOTE — TELEPHONE ENCOUNTER
IS HAVING ISSUES WITH HER LEG- IT HAS BEEN SWOLLEN AND RED FOR THE LAST WEEK- WANTS TO SPEAK TO NURSE ABOUT THIS Responsibility to children, family, or others/Identifies reasons for living/Supportive social network of family or friends

## 2023-01-09 ENCOUNTER — PATIENT OUTREACH (OUTPATIENT)
Dept: CASE MANAGEMENT | Age: 68
End: 2023-01-09

## 2023-01-16 ENCOUNTER — TELEPHONE (OUTPATIENT)
Dept: FAMILY MEDICINE CLINIC | Facility: CLINIC | Age: 68
End: 2023-01-16

## 2023-01-16 RX ORDER — METOPROLOL SUCCINATE 25 MG/1
25 TABLET, EXTENDED RELEASE ORAL DAILY
Qty: 90 TABLET | Refills: 0 | Status: SHIPPED | OUTPATIENT
Start: 2023-01-16

## 2023-01-16 RX ORDER — POTASSIUM CHLORIDE 1500 MG/1
TABLET, EXTENDED RELEASE ORAL
Qty: 90 TABLET | Refills: 0 | Status: SHIPPED | OUTPATIENT
Start: 2023-01-16

## 2023-01-16 NOTE — TELEPHONE ENCOUNTER
----- Message from Valarie Merrill sent at 1/16/2023  4:25 PM CST -----  Took a home covid test and is negative

## 2023-01-16 NOTE — TELEPHONE ENCOUNTER
Future appt:    Last Appointment with provider:   8/10/2022  Last appointment at [facility]:  8/10/2022  Last px: 6/29/22    Metoprolol refilled on 12/28/21 for #90, 1 refill  Potassium-  Noted under historical - need to clarify dose      Pt contacted-pt states she is only taking 20 meq of Potassium daily. Pt proceeded to say she has been dealing with right sided abdominal pain that radiates into her lower back for 10 days now. Pt has chills and nausea as well off/on. Increased fatigue reported. No fever or diarrhea reported. Pt agreed to complete home covid test and call office with report. Pt scheduled for appt with CR tomorrow afternoon. Pt is not out of medication at this time. Future Appointments   Date Time Provider Syd Koch   1/17/2023  3:00 PM Brannon Tanner MD [facility]             Cholesterol, Total (mg/dL)   Date Value   06/29/2022 138     Total Cholesterol (mg/dL)   Date Value   03/09/2019 127     HDL Cholesterol (mg/dL)   Date Value   06/29/2022 43   03/09/2019 51     LDL Cholesterol (mg/dL)   Date Value   06/29/2022 66   03/09/2019 52     Triglycerides (mg/dL)   Date Value   06/29/2022 169 (H)   03/09/2019 119     Lab Results   Component Value Date     06/29/2022    A1C 6.0 (H) 06/29/2022     Lab Results   Component Value Date    TSH 1.600 06/29/2022       No follow-ups on file.   r

## 2023-01-17 ENCOUNTER — OFFICE VISIT (OUTPATIENT)
Dept: FAMILY MEDICINE CLINIC | Facility: CLINIC | Age: 68
End: 2023-01-17
Payer: MEDICARE

## 2023-01-17 VITALS
TEMPERATURE: 98 F | DIASTOLIC BLOOD PRESSURE: 78 MMHG | HEART RATE: 70 BPM | OXYGEN SATURATION: 96 % | SYSTOLIC BLOOD PRESSURE: 126 MMHG | RESPIRATION RATE: 18 BRPM

## 2023-01-17 DIAGNOSIS — R10.9 RIGHT FLANK PAIN: Primary | ICD-10-CM

## 2023-01-17 LAB
ALBUMIN/GLOBULIN RATIO: 1.4
ALBUMIN: 4.1 G/DL
ALKALINE PHOSPHATASE: 96
ALT: 5
ANION GAP: 11
AST: 20
BILIRUB UR QL STRIP.AUTO: NEGATIVE
BILIRUBIN, TOTAL: 0.4 MG/DL
BILIRUBIN: NEGATIVE
BUN/CREATININE RATIO: 26
BUN: 33
CALCIUM: 9.1
CARBON DIOXIDE: 28
CHLORIDE: 104
COLOR UR AUTO: YELLOW
CREATININE: 1.27 MG/DL (ref 0.6–1.3)
GFR NON-AFRICAN AMERICAN: 46
GLOBULIN: 2.9
GLUCOSE (URINE DIPSTICK): NEGATIVE MG/DL
GLUCOSE UR STRIP.AUTO-MCNC: NEGATIVE MG/DL
GLUCOSE: 117
HCT: 37.2
HGB: 11.7
KETONES (URINE DIPSTICK): NEGATIVE MG/DL
LEUKOCYTE ESTERASE UR QL STRIP.AUTO: NEGATIVE
LEUKOCYTES: NEGATIVE
MEAN CELL VOLUME: 86.5
MEAN CORPUSCULAR HEMOGLOBIN: 27.2
MEAN CORPUSCULAR HGB CONC: 31.5
MEAN PLATELET VOLUME: 9.9
MULTISTIX LOT#: ABNORMAL NUMERIC
NITRITE UR QL STRIP.AUTO: NEGATIVE
NITRITE, URINE: NEGATIVE
PH UR STRIP.AUTO: 5 [PH] (ref 5–8)
PH, URINE: 5 (ref 4.5–8)
PLT: 274
POTASSIUM: 3.9
PROT UR STRIP.AUTO-MCNC: 30 MG/DL
PROTEIN (URINE DIPSTICK): 30 MG/DL
RBC #/AREA URNS AUTO: >10 /HPF
RED BLOOD COUNT: 4.3
RED CELL DISTRIBUTION WIDTH: 15.1
SODIUM: 143
SP GR UR STRIP.AUTO: 1.02 (ref 1–1.03)
SPECIFIC GRAVITY: 1.02 (ref 1–1.03)
TOTAL PROTEIN: 7
UROBILINOGEN UR STRIP.AUTO-MCNC: <2 MG/DL
UROBILINOGEN,SEMI-QN: 0.2 MG/DL (ref 0–1.9)
WBC: 11.1

## 2023-01-17 PROCEDURE — 1125F AMNT PAIN NOTED PAIN PRSNT: CPT | Performed by: FAMILY MEDICINE

## 2023-01-17 PROCEDURE — 81001 URINALYSIS AUTO W/SCOPE: CPT | Performed by: FAMILY MEDICINE

## 2023-01-17 PROCEDURE — 99214 OFFICE O/P EST MOD 30 MIN: CPT | Performed by: FAMILY MEDICINE

## 2023-01-17 PROCEDURE — 81003 URINALYSIS AUTO W/O SCOPE: CPT | Performed by: FAMILY MEDICINE

## 2023-01-17 RX ORDER — ESCITALOPRAM OXALATE 10 MG/1
TABLET ORAL
Qty: 90 TABLET | Refills: 0 | Status: SHIPPED | OUTPATIENT
Start: 2023-01-17

## 2023-01-17 NOTE — TELEPHONE ENCOUNTER
Future appt: Your appointments     Date & Time Appointment Department [facility])    Jan 17, 2023  3:00 PM CST Exam - Established with Albertina King MD [address] [facility] [address], [place] ([facility])            [facility] [id number] [phone]        Last Appointment with provider:   8/10/2022  Last appointment at [facility] [place]:  8/10/2022  Cholesterol, Total (mg/dL)   Date Value   06/29/2022 138     Total Cholesterol (mg/dL)   Date Value   03/09/2019 127     HDL Cholesterol (mg/dL)   Date Value   06/29/2022 43   03/09/2019 51     LDL Cholesterol (mg/dL)   Date Value   06/29/2022 66   03/09/2019 52     Triglycerides (mg/dL)   Date Value   06/29/2022 169 (H)   03/09/2019 119     Lab Results   Component Value Date     06/29/2022    A1C 6.0 (H) 06/29/2022     Lab Results   Component Value Date    TSH 1.600 06/29/2022     Last RF:  7/25/2022    No follow-ups on file.

## 2023-01-17 NOTE — PATIENT INSTRUCTIONS
cT -- r/o kidney stone    Hydrate well with water  \  Check urine-r/o infection    Pt to ED if increased pain

## 2023-01-18 ENCOUNTER — TELEPHONE (OUTPATIENT)
Dept: FAMILY MEDICINE CLINIC | Facility: CLINIC | Age: 68
End: 2023-01-18

## 2023-01-18 NOTE — TELEPHONE ENCOUNTER
----- Message from Govind Whitney MD sent at 1/18/2023  7:54 AM CST -----  Urinalysis indicates small amount of blood but no sign of infection. Patient advised to proceed with CT scan to rule out kidney stone. Results forwarded to patient via [email or web address] system. Patient encouraged to call for any questions or concerns.

## 2023-01-23 NOTE — TELEPHONE ENCOUNTER
Future appt:    Last Appointment with provider:   1/17/2023  Last appointment at [facility]:  1/17/2023  Last px: 6/29/22    Carbidopa-Levodopa refilled 12/27/22 for #90, 0 refills  (pt takes one TID)    Cholesterol, Total (mg/dL)   Date Value   06/29/2022 138     Total Cholesterol (mg/dL)   Date Value   03/09/2019 127     HDL Cholesterol (mg/dL)   Date Value   06/29/2022 43   03/09/2019 51     LDL Cholesterol (mg/dL)   Date Value   06/29/2022 66   03/09/2019 52     Triglycerides (mg/dL)   Date Value   06/29/2022 169 (H)   03/09/2019 119     Lab Results   Component Value Date     06/29/2022    A1C 6.0 (H) 06/29/2022     Lab Results   Component Value Date    TSH 1.600 06/29/2022       No follow-ups on file.

## 2023-01-27 ENCOUNTER — TELEPHONE (OUTPATIENT)
Dept: FAMILY MEDICINE CLINIC | Facility: CLINIC | Age: 68
End: 2023-01-27

## 2023-01-27 DIAGNOSIS — R32 URINARY INCONTINENCE, UNSPECIFIED TYPE: ICD-10-CM

## 2023-01-27 DIAGNOSIS — N20.0 KIDNEY STONE: Primary | ICD-10-CM

## 2023-01-27 NOTE — TELEPHONE ENCOUNTER
CT of abdomen/pelvis competed at [facility] on 1/25/23. 1. \"nonobstructing bilateral renal calculi noted\"- per CR- pt to monitor and to f/u with urology. 2. \"interval enlargement of the iliopsoas soft tissue enlargement/mass from 1/2/22\"  See full report via care everywhere. Per CR- was advised to f/u with ortho.     Pt contacted-   Pt does not have urologist- will need referral local.    Pt agreed to f/u with Dr[address] Lacretia Holstein [address]

## 2023-01-27 NOTE — TELEPHONE ENCOUNTER
Pt would like to also see urology and discuss female incontinence.  Can that get added as dx to referral? Pt states this is not a new concern

## 2023-03-10 ENCOUNTER — PATIENT OUTREACH (OUTPATIENT)
Dept: CASE MANAGEMENT | Age: 68
End: 2023-03-10

## 2023-03-10 ENCOUNTER — TELEPHONE (OUTPATIENT)
Dept: FAMILY MEDICINE CLINIC | Facility: CLINIC | Age: 68
End: 2023-03-10

## 2023-03-10 DIAGNOSIS — Z86.711 HISTORY OF PULMONARY EMBOLUS (PE): ICD-10-CM

## 2023-03-10 DIAGNOSIS — K90.0 CELIAC DISEASE: ICD-10-CM

## 2023-03-10 DIAGNOSIS — D68.9 COAGULOPATHY (HCC): ICD-10-CM

## 2023-03-10 DIAGNOSIS — M15.9 PRIMARY OSTEOARTHRITIS INVOLVING MULTIPLE JOINTS: ICD-10-CM

## 2023-03-10 DIAGNOSIS — M05.79 RHEUMATOID ARTHRITIS INVOLVING MULTIPLE SITES WITH POSITIVE RHEUMATOID FACTOR (HCC): ICD-10-CM

## 2023-03-10 DIAGNOSIS — I10 BENIGN ESSENTIAL HYPERTENSION: ICD-10-CM

## 2023-03-10 RX ORDER — METOPROLOL SUCCINATE 25 MG/1
50 TABLET, EXTENDED RELEASE ORAL DAILY
COMMUNITY

## 2023-03-10 RX ORDER — SOLIFENACIN SUCCINATE 10 MG/1
5 TABLET, FILM COATED ORAL DAILY
COMMUNITY
Start: 2023-02-27

## 2023-03-10 NOTE — TELEPHONE ENCOUNTER
BP today:  164/75, pulse: 65.  Pt takes Metoprolol 25 mg between 9-10 am.  Pt states she feels fine. Please advise.

## 2023-03-10 NOTE — TELEPHONE ENCOUNTER
Spoke to patient for monthly CCM. Patient was prescribed a new RX by urulogist Solifenacin Succinate 10 MG Oral Tab 1/2 tab daily. Noticed B/P has been elevated and unsure if it is related new Rx. Wants to know if this medication can cause elevated B/P? Please contact patient to advise.     Thank you

## 2023-03-10 NOTE — TELEPHONE ENCOUNTER
Pt started Solifenacin on 2/28. Pt states her BP at hematology appt on Monday was 191/80 and 189/81. Pt reports BP yesterday at home was 154/79. Pt has not checked BP today. Pt has not called Dr. Mary Jane Mao. Pt asked to check BP today and to call back. Pt agreed to do so.

## 2023-03-10 NOTE — TELEPHONE ENCOUNTER
Please have pt increase metoprolol to 50 mg a day. Report BP or f/u appt next week archana if numbers still elevated.   Pt to let Dr. Vini Rivera know of issue

## 2023-03-27 RX ORDER — METOPROLOL SUCCINATE 25 MG/1
TABLET, EXTENDED RELEASE ORAL
Qty: 90 TABLET | Refills: 1 | Status: SHIPPED | OUTPATIENT
Start: 2023-03-27

## 2023-03-27 NOTE — TELEPHONE ENCOUNTER
Future appt:    Last Appointment with provider:   1/17/2023  Last appointment at [facility] [place]:  1/17/2023    Metoprolol - med list states pt is taking 25mg- 2 tablets daily. Pharmacy request for 25mg once daily. Pt had Metoprolol dose increase on 3/10/23 to 50mg per day. Spoke with pt. Pt states okay to send in for 50mg tablet pt can take once daily. Routed to PCP    Cholesterol, Total (mg/dL)   Date Value   06/29/2022 138     Total Cholesterol (mg/dL)   Date Value   03/09/2019 127     HDL Cholesterol (mg/dL)   Date Value   06/29/2022 43   03/09/2019 51     LDL Cholesterol (mg/dL)   Date Value   06/29/2022 66   03/09/2019 52     Triglycerides (mg/dL)   Date Value   06/29/2022 169 (H)   03/09/2019 119     Lab Results   Component Value Date     06/29/2022    A1C 6.0 (H) 06/29/2022     Lab Results   Component Value Date    TSH 1.600 06/29/2022       No follow-ups on file.

## 2023-03-30 ENCOUNTER — PATIENT MESSAGE (OUTPATIENT)
Dept: FAMILY MEDICINE CLINIC | Facility: CLINIC | Age: 68
End: 2023-03-30

## 2023-03-30 ENCOUNTER — TELEPHONE (OUTPATIENT)
Dept: FAMILY MEDICINE CLINIC | Facility: CLINIC | Age: 68
End: 2023-03-30

## 2023-03-30 RX ORDER — METOPROLOL SUCCINATE 50 MG/1
509 TABLET, EXTENDED RELEASE ORAL DAILY
Qty: 90 TABLET | Refills: 0 | Status: SHIPPED | OUTPATIENT
Start: 2023-03-30 | End: 2023-03-31

## 2023-03-30 NOTE — TELEPHONE ENCOUNTER
See telephone note 3/10/23. Metoprolol was increased to 50mg daily. The pharmacy states the recent metoprolol 25mg refill cannot be filled until April 7th. Patient is asking for a new prescription for Metoprolol 50mg.  [facility] [place]

## 2023-03-30 NOTE — TELEPHONE ENCOUNTER
Requesting a refill for Metoprolol ER 50mg  Pharmacy[facility] [place]  Future Appointments   Date Time Provider Syd Koch   6/27/2023  2:00 PM Nat Yao MD [facility]

## 2023-03-30 NOTE — TELEPHONE ENCOUNTER
From: Stephon Nunez  To: Nelsy Cifuentes MD  Sent: 3/30/2023 4:10 PM CDT  Subject: Blood pressure readings    Per Dr. Veronica Mckinnon. Pat's request I have logged my blood pressure readings for one week after adjusting my Metoprolol 25 mg daily to 50 mg daily. The readings are:     3/10 164/75 (in Doctor office)   3/11 174/103    3/12 155/77   3/13 119/83   3/14 162/75    3/15 101/90   3/16 151/79   3/17 130/85   3/18 151/105   3/27 137/102 (in Doctor office)  Give me call if you have questions. Have a great day.      Kelsie Kansas

## 2023-03-31 RX ORDER — METOPROLOL SUCCINATE 50 MG/1
50 TABLET, EXTENDED RELEASE ORAL DAILY
Qty: 90 TABLET | Refills: 0 | Status: SHIPPED | OUTPATIENT
Start: 2023-03-31

## 2023-03-31 NOTE — TELEPHONE ENCOUNTER
Pt was to report BP after dose increase. Please check with pt for info on status since medication change.  Pt with no appt for f.u since medication change      Future Appointments   Date Time Provider Syd Koch   6/27/2023  2:00 PM Alvino Herbert MD [facility]

## 2023-03-31 NOTE — TELEPHONE ENCOUNTER
BP log reviewed. BP higher than desired with diastolic > 537  Ok for rx metoprolol.  Pt should make appointment to fy on BP manangement

## 2023-04-03 ENCOUNTER — TELEPHONE (OUTPATIENT)
Dept: FAMILY MEDICINE CLINIC | Facility: CLINIC | Age: 68
End: 2023-04-03

## 2023-04-03 NOTE — TELEPHONE ENCOUNTER
Please check with patient re hw she has mananged in the past. Routine cleanings typically would not require holding blood thinners.

## 2023-04-03 NOTE — TELEPHONE ENCOUNTER
Pt stated in the past she has not help blood thinner for cleanings. The only reason she has held it is for a wisdom tooth that was pulled. Advised per CR don't need to hold for cleaning then as she has not done so in the past.     Notified Katharine at Dr. Andrei Estrella office.

## 2023-04-03 NOTE — TELEPHONE ENCOUNTER
Patient is scheduled for a teeth cleaning on 4/19/2023. Needs to find out what the protocol is since patient is taking Eliquis.

## 2023-04-03 NOTE — TELEPHONE ENCOUNTER
Called from Dr. Festus Gutiérrez office. Pt has a new patient hygienist appt for teething cleaning, x-ray, exam on 4/19. Katharine states with pt being on Eliquis she wanted to double checked to see if pt is okay to pursue her appt or would pt need to hold medication? Katharine states with pt being a new a patient they are not sure what dental status is and wanted to be prepared.     Routed to PCP

## 2023-04-10 ENCOUNTER — OFFICE VISIT (OUTPATIENT)
Dept: FAMILY MEDICINE CLINIC | Facility: CLINIC | Age: 68
End: 2023-04-10
Payer: MEDICARE

## 2023-04-10 VITALS
RESPIRATION RATE: 20 BRPM | SYSTOLIC BLOOD PRESSURE: 146 MMHG | OXYGEN SATURATION: 96 % | DIASTOLIC BLOOD PRESSURE: 84 MMHG | TEMPERATURE: 97 F | HEART RATE: 60 BPM

## 2023-04-10 DIAGNOSIS — I10 BENIGN ESSENTIAL HYPERTENSION: Primary | ICD-10-CM

## 2023-04-10 DIAGNOSIS — Z99.3 WHEELCHAIR DEPENDENCE: ICD-10-CM

## 2023-04-10 DIAGNOSIS — R60.0 PEDAL EDEMA: ICD-10-CM

## 2023-04-10 PROCEDURE — 99214 OFFICE O/P EST MOD 30 MIN: CPT | Performed by: FAMILY MEDICINE

## 2023-04-10 RX ORDER — FUROSEMIDE 20 MG/1
20 TABLET ORAL 2 TIMES DAILY
Qty: 30 TABLET | Refills: 3 | Status: SHIPPED | OUTPATIENT
Start: 2023-04-10

## 2023-04-10 NOTE — PATIENT INSTRUCTIONS
Rec add lasix daily    Rec home BP cuff    Continue metoprol at 50 mg     Rec compression stockings every am    Recheck 4-6 week

## 2023-04-12 ENCOUNTER — PATIENT OUTREACH (OUTPATIENT)
Dept: CASE MANAGEMENT | Age: 68
End: 2023-04-12

## 2023-04-17 RX ORDER — POTASSIUM CHLORIDE 1500 MG/1
TABLET, EXTENDED RELEASE ORAL
Qty: 90 TABLET | Refills: 0 | Status: SHIPPED | OUTPATIENT
Start: 2023-04-17

## 2023-04-17 RX ORDER — ESCITALOPRAM OXALATE 10 MG/1
TABLET ORAL
Qty: 90 TABLET | Refills: 0 | Status: SHIPPED | OUTPATIENT
Start: 2023-04-17

## 2023-04-17 NOTE — TELEPHONE ENCOUNTER
Future appt: Your appointments     Date & Time Appointment Department [facility])    May 17, 2023 11:00 AM CDT Follow up - Extended with Mal Flaherty MD [address] ([place])        Jun 27, 2023  2:00 PM CDT Medicare Annual Well Visit with Mal Flaherty MD [address] ([place])            [address] [id number]  [phone]        Last Appointment with provider:   4/10/2023  Last appointment at [facility] [place]:  4/10/2023    Connie Giles refilled 1/16/23 for #90, 0 refills  Cholesterol, Total (mg/dL)   Date Value   06/29/2022 138     Total Cholesterol (mg/dL)   Date Value   03/09/2019 127     HDL Cholesterol (mg/dL)   Date Value   06/29/2022 43   03/09/2019 51     LDL Cholesterol (mg/dL)   Date Value   06/29/2022 66   03/09/2019 52     Triglycerides (mg/dL)   Date Value   06/29/2022 169 (H)   03/09/2019 119     Lab Results   Component Value Date     06/29/2022    A1C 6.0 (H) 06/29/2022     Lab Results   Component Value Date    TSH 1.600 06/29/2022       No follow-ups on file.

## 2023-04-17 NOTE — TELEPHONE ENCOUNTER
Future appt: Your appointments     Date & Time Appointment Department [facility])    May 17, 2023 11:00 AM CDT Follow up - Extended with MD Lizzy Mackey [place] ([place])        Jun 27, 2023  2:00 PM CDT Medicare Annual Well Visit with MD Lizzy Mackey [place] ([place])            [facility] [phone]  [phone]        Last Appointment with provider:   4/10/2023  Last appointment at [facility]:  4/10/2023  Last px:  6/29/22    Escitalopram refilled 1/17/23 #90, 0 refills  Cholesterol, Total (mg/dL)   Date Value   06/29/2022 138     Total Cholesterol (mg/dL)   Date Value   03/09/2019 127     HDL Cholesterol (mg/dL)   Date Value   06/29/2022 43   03/09/2019 51     LDL Cholesterol (mg/dL)   Date Value   06/29/2022 66   03/09/2019 52     Triglycerides (mg/dL)   Date Value   06/29/2022 169 (H)   03/09/2019 119     Lab Results   Component Value Date     06/29/2022    A1C 6.0 (H) 06/29/2022     Lab Results   Component Value Date    TSH 1.600 06/29/2022       No follow-ups on file.

## 2023-04-18 RX ORDER — FUROSEMIDE 20 MG/1
TABLET ORAL
Qty: 30 TABLET | Refills: 3 | OUTPATIENT
Start: 2023-04-18

## 2023-04-18 NOTE — TELEPHONE ENCOUNTER
Future appt: Your appointments     Date & Time Appointment Department [facility])    May 17, 2023 11:00 AM CDT Follow up - Extended with Yohannes Dalal MD [address] ([place])        Jun 27, 2023  2:00 PM CDT Medicare Annual Well Visit with MD Savannah Gomez [address] [place] ([place])            [address] [facility]  [phone]        Last Appointment with provider:   4/10/2023  [facility] appointment at [facility]:  4/10/2023    Lasix refilled on 4/10/23 for #30, 3 refills  Request too soon, declined  Cholesterol, Total (mg/dL)   Date Value   06/29/2022 138     Total Cholesterol (mg/dL)   Date Value   03/09/2019 127     HDL Cholesterol (mg/dL)   Date Value   06/29/2022 43   03/09/2019 51     LDL Cholesterol (mg/dL)   Date Value   06/29/2022 66   03/09/2019 52     Triglycerides (mg/dL)   Date Value   06/29/2022 169 (H)   03/09/2019 119     Lab Results   Component Value Date     06/29/2022    A1C 6.0 (H) 06/29/2022     Lab Results   Component Value Date    TSH 1.600 06/29/2022       No follow-ups on file.

## 2023-04-19 ENCOUNTER — TELEPHONE (OUTPATIENT)
Dept: FAMILY MEDICINE CLINIC | Facility: CLINIC | Age: 68
End: 2023-04-19

## 2023-04-19 NOTE — TELEPHONE ENCOUNTER
Needs to verify if patient needs to be off any medications prior to her dental procedure ( procedure is not scheduled yet)    Katharine wants to talk to the RN to let them know what exactly they are going to do at the office

## 2023-05-09 ENCOUNTER — PATIENT OUTREACH (OUTPATIENT)
Dept: CASE MANAGEMENT | Age: 68
End: 2023-05-09

## 2023-05-12 ENCOUNTER — PATIENT MESSAGE (OUTPATIENT)
Dept: FAMILY MEDICINE CLINIC | Facility: CLINIC | Age: 68
End: 2023-05-12

## 2023-05-12 NOTE — TELEPHONE ENCOUNTER
From: Hayley Castellano  To: Amol Bernard MD  Sent: 5/12/2023 10:57 AM CDT  Subject: Hip pain    Good morning,  I am experiencing pain in my upper left hip for the past 3 days. In January 2022, I had a mass in that location and it was drained. In January of 2023, I had a CT scan for a different complaint and the mass was back. It is starting to hurt, sometimes pretty bad. I have an appointment with Dr. Tiffanie Hallman on 5/17/23. I am not sure if I can wait that long if the pain gets worse so should I go to the ER?   Thank you for your help,  Sweta Urena

## 2023-05-12 NOTE — TELEPHONE ENCOUNTER
Pt contacted- pt states she contacted Dr. Gayla Rosales, pt was told to contact ortho. Pt states she tried to contact ortho- however, pt is not able to get in. Pt urged to to to ER with immediate concerns, pt agreed to do so.

## 2023-05-12 NOTE — TELEPHONE ENCOUNTER
Reviewed pt comments and CT  - pt to ED for increased concerns. Has she contacted her orthopedic specialist or Dr. Carlos A Sawyer?

## 2023-05-12 NOTE — TELEPHONE ENCOUNTER
No appt available today. X-ray services are not available on tomorrow (Saturday). Attempted to call pt at home and on cell. Sounds like pt may need to go to ER if s/s are acute. Routed to PCP.

## 2023-05-15 RX ORDER — FUROSEMIDE 20 MG/1
TABLET ORAL
Qty: 30 TABLET | Refills: 3 | OUTPATIENT
Start: 2023-05-15

## 2023-05-17 ENCOUNTER — TELEPHONE (OUTPATIENT)
Dept: FAMILY MEDICINE CLINIC | Facility: CLINIC | Age: 68
End: 2023-05-17

## 2023-05-17 ENCOUNTER — OFFICE VISIT (OUTPATIENT)
Dept: FAMILY MEDICINE CLINIC | Facility: CLINIC | Age: 68
End: 2023-05-17
Payer: MEDICARE

## 2023-05-17 VITALS
HEART RATE: 78 BPM | TEMPERATURE: 98 F | DIASTOLIC BLOOD PRESSURE: 82 MMHG | SYSTOLIC BLOOD PRESSURE: 136 MMHG | RESPIRATION RATE: 18 BRPM | OXYGEN SATURATION: 96 %

## 2023-05-17 DIAGNOSIS — I10 BENIGN ESSENTIAL HYPERTENSION: Primary | ICD-10-CM

## 2023-05-17 DIAGNOSIS — R60.0 PEDAL EDEMA: ICD-10-CM

## 2023-05-17 DIAGNOSIS — M15.9 PRIMARY OSTEOARTHRITIS INVOLVING MULTIPLE JOINTS: ICD-10-CM

## 2023-05-17 PROCEDURE — 99214 OFFICE O/P EST MOD 30 MIN: CPT | Performed by: FAMILY MEDICINE

## 2023-05-17 RX ORDER — METOPROLOL SUCCINATE 100 MG/1
50 TABLET, EXTENDED RELEASE ORAL DAILY
Qty: 90 TABLET | Refills: 0 | Status: SHIPPED | OUTPATIENT
Start: 2023-05-17

## 2023-05-17 NOTE — PATIENT INSTRUCTIONS
Rec home monitor BP    Rec increase metoprolol for BP control    Recheck 6 weeks    Continue care Hematology and ortho

## 2023-05-17 NOTE — TELEPHONE ENCOUNTER
[facility]  Re: Catalina Leaenn 5/300mg    Discussed with CR-  Pt had RX from ER,  Pt needs to discuss refill with pharmacy    Pt contacted-  Pt advised to contact [facility]-  Pt advised to have pharmacy recheck original prescription/order for dose. Pt informed 5/300mg dose is not typical dose for Norco.  Pt informed if stock is an issue, pt may need to  original prescription to take to another pharmacy. Pt agreed to do so.

## 2023-05-23 ENCOUNTER — TELEPHONE (OUTPATIENT)
Dept: FAMILY MEDICINE CLINIC | Facility: CLINIC | Age: 68
End: 2023-05-23

## 2023-05-23 RX ORDER — METOPROLOL SUCCINATE 100 MG/1
100 TABLET, EXTENDED RELEASE ORAL DAILY
Qty: 90 TABLET | Refills: 0 | Status: SHIPPED | OUTPATIENT
Start: 2023-05-23

## 2023-05-23 NOTE — TELEPHONE ENCOUNTER
Please confirm with patient that she is taking 1  100 mg tablet daily. Review Dr. Tracie Stewart's previous note said patient was taking 50 mg of metoprolol-in plan it says increase metoprolol for blood pressure control. If patient is taking 100 mg please ask her what her recent blood pressures are and how she is feeling-any lightheadedness, etc.-if patient is doing okay-we will send new prescription.

## 2023-05-23 NOTE — TELEPHONE ENCOUNTER
Refill sent-patient to monitor blood pressure have her either call with numbers or send them through [employer] to Dr. Odilon Horner next week.

## 2023-05-23 NOTE — TELEPHONE ENCOUNTER
Spoke with patient. States she just started taking a full tab starting Sunday, 5/21/2023 so she doesn't feel it has had time to work yet. Patient denies having any lightheadedness or any other concerning symptoms. Please advise new rx.

## 2023-05-23 NOTE — TELEPHONE ENCOUNTER
Fax received from [facility] in [place] stating patient informed them she is taking Metoprolol 100mg daily; she is not taking 1/2 tab daily. [facility] requesting a new prescription to reflex the correct dosing. Please advise.

## 2023-06-05 RX ORDER — FUROSEMIDE 20 MG/1
TABLET ORAL
Qty: 30 TABLET | Refills: 3 | Status: SHIPPED | OUTPATIENT
Start: 2023-06-05

## 2023-06-05 NOTE — TELEPHONE ENCOUNTER
Last Refill: 4/10/2023 #30 with 3 refills  Last Px: 6/29/2022  F/U Instructions: Recheck 6 weeks    Future appt: Your appointments     Date & Time Appointment Department [facility])    Jun 27, 2023  2:00 PM CDT Medicare Annual Well Visit with Nora Christianson MD [address], [place] ([place] [address] [place] [id number] [phone]        Last Appointment with provider:   5/17/2023  Last appointment at [facility] [place]:  5/17/2023  Cholesterol, Total (mg/dL)   Date Value   06/29/2022 138     Total Cholesterol (mg/dL)   Date Value   03/09/2019 127     HDL Cholesterol (mg/dL)   Date Value   06/29/2022 43   03/09/2019 51     LDL Cholesterol (mg/dL)   Date Value   06/29/2022 66   03/09/2019 52     Triglycerides (mg/dL)   Date Value   06/29/2022 169 (H)   03/09/2019 119     Lab Results   Component Value Date     06/29/2022    A1C 6.0 (H) 06/29/2022     Lab Results   Component Value Date    TSH 1.600 06/29/2022       No follow-ups on file.

## 2023-06-12 ENCOUNTER — PATIENT OUTREACH (OUTPATIENT)
Dept: CASE MANAGEMENT | Age: 68
End: 2023-06-12

## 2023-06-12 DIAGNOSIS — L03.116 CELLULITIS OF LEFT LEG: ICD-10-CM

## 2023-06-12 DIAGNOSIS — M15.9 PRIMARY OSTEOARTHRITIS INVOLVING MULTIPLE JOINTS: ICD-10-CM

## 2023-06-12 DIAGNOSIS — K90.0 CELIAC DISEASE: ICD-10-CM

## 2023-06-12 DIAGNOSIS — I10 BENIGN ESSENTIAL HYPERTENSION: ICD-10-CM

## 2023-06-12 DIAGNOSIS — R60.0 PEDAL EDEMA: ICD-10-CM

## 2023-06-12 DIAGNOSIS — M05.79 RHEUMATOID ARTHRITIS INVOLVING MULTIPLE SITES WITH POSITIVE RHEUMATOID FACTOR (HCC): ICD-10-CM

## 2023-06-12 RX ORDER — TRAMADOL HYDROCHLORIDE 50 MG/1
TABLET ORAL
COMMUNITY
Start: 2023-05-19

## 2023-06-20 RX ORDER — FUROSEMIDE 20 MG/1
TABLET ORAL
Qty: 30 TABLET | Refills: 3 | OUTPATIENT
Start: 2023-06-20

## 2023-06-27 RX ORDER — METOPROLOL SUCCINATE 50 MG/1
TABLET, EXTENDED RELEASE ORAL
Qty: 90 TABLET | Refills: 0 | OUTPATIENT
Start: 2023-06-27

## 2023-07-07 ENCOUNTER — OFFICE VISIT (OUTPATIENT)
Dept: FAMILY MEDICINE CLINIC | Facility: CLINIC | Age: 68
End: 2023-07-07
Payer: MEDICARE

## 2023-07-07 VITALS
BODY MASS INDEX: 36.15 KG/M2 | WEIGHT: 217 LBS | TEMPERATURE: 99 F | HEIGHT: 65 IN | SYSTOLIC BLOOD PRESSURE: 134 MMHG | RESPIRATION RATE: 20 BRPM | DIASTOLIC BLOOD PRESSURE: 86 MMHG | OXYGEN SATURATION: 94 % | HEART RATE: 64 BPM

## 2023-07-07 DIAGNOSIS — G62.9 NEUROPATHY: ICD-10-CM

## 2023-07-07 DIAGNOSIS — L40.50 PSORIATIC ARTHRITIS (HCC): ICD-10-CM

## 2023-07-07 DIAGNOSIS — Z00.00 ENCOUNTER FOR ANNUAL HEALTH EXAMINATION: Primary | ICD-10-CM

## 2023-07-07 DIAGNOSIS — E83.42 HYPOMAGNESEMIA: ICD-10-CM

## 2023-07-07 DIAGNOSIS — I10 BENIGN ESSENTIAL HYPERTENSION: ICD-10-CM

## 2023-07-07 DIAGNOSIS — Z22.7 TB LUNG, LATENT: ICD-10-CM

## 2023-07-07 DIAGNOSIS — R01.1 HEART MURMUR: ICD-10-CM

## 2023-07-07 DIAGNOSIS — R60.0 PEDAL EDEMA: ICD-10-CM

## 2023-07-07 DIAGNOSIS — Z86.711 HISTORY OF PULMONARY EMBOLUS (PE): ICD-10-CM

## 2023-07-07 DIAGNOSIS — H35.3130 BILATERAL NONEXUDATIVE AGE-RELATED MACULAR DEGENERATION, UNSPECIFIED STAGE: ICD-10-CM

## 2023-07-07 DIAGNOSIS — K76.0 FATTY LIVER: ICD-10-CM

## 2023-07-07 DIAGNOSIS — G47.30 SLEEP APNEA, UNSPECIFIED TYPE: ICD-10-CM

## 2023-07-07 DIAGNOSIS — I73.9 PERIPHERAL ARTERIAL DISEASE (HCC): ICD-10-CM

## 2023-07-07 DIAGNOSIS — E87.6 HYPOPOTASSEMIA: ICD-10-CM

## 2023-07-07 DIAGNOSIS — K90.0 CELIAC DISEASE: ICD-10-CM

## 2023-07-07 DIAGNOSIS — M05.79 RHEUMATOID ARTHRITIS INVOLVING MULTIPLE SITES WITH POSITIVE RHEUMATOID FACTOR (HCC): ICD-10-CM

## 2023-07-07 DIAGNOSIS — R79.89 ELEVATED LIVER FUNCTION TESTS: ICD-10-CM

## 2023-07-07 DIAGNOSIS — J30.2 CHRONIC SEASONAL ALLERGIC RHINITIS: ICD-10-CM

## 2023-07-07 DIAGNOSIS — Z99.3 WHEELCHAIR DEPENDENCE: ICD-10-CM

## 2023-07-07 DIAGNOSIS — D68.9 COAGULOPATHY (HCC): ICD-10-CM

## 2023-07-07 PROBLEM — I82.4Z2 ACUTE DEEP VEIN THROMBOSIS (DVT) OF DISTAL VEIN OF LEFT LOWER EXTREMITY (HCC): Status: RESOLVED | Noted: 2022-08-10 | Resolved: 2023-07-07

## 2023-07-07 PROBLEM — I82.432 ACUTE DEEP VEIN THROMBOSIS (DVT) OF POPLITEAL VEIN OF LEFT LOWER EXTREMITY (HCC): Status: RESOLVED | Noted: 2017-03-29 | Resolved: 2023-07-07

## 2023-07-07 PROCEDURE — G0439 PPPS, SUBSEQ VISIT: HCPCS | Performed by: FAMILY MEDICINE

## 2023-07-07 PROCEDURE — 99214 OFFICE O/P EST MOD 30 MIN: CPT | Performed by: FAMILY MEDICINE

## 2023-07-07 PROCEDURE — 1125F AMNT PAIN NOTED PAIN PRSNT: CPT | Performed by: FAMILY MEDICINE

## 2023-07-07 NOTE — PATIENT INSTRUCTIONS
Rec pulmonary consult-- sleep apnea, latent TB, hx PE    Mammogram - January    Rec covid booster vaccine- wait 2-4 weeks and get Tdap vaccine( tetnus)    Rec fasting labs    Ok for trial lasix once a day- stop mid day dosing    Rec EKG and echocardiogram    Continue medications    Sylvie Torres's SCREENING SCHEDULE   Tests on this list are recommended by your physician but may not be covered, or covered at this frequency, by your insurer. Please check with your insurance carrier before scheduling to verify coverage.    PREVENTATIVE SERVICES FREQUENCY &  COVERAGE DETAILS LAST COMPLETION DATE   Diabetes Screening    Fasting Blood Sugar /  Glucose    One screening every 12 months if never tested or if previously tested but not diagnosed with pre-diabetes   One screening every 6 months if diagnosed with pre-diabetes Lab Results   Component Value Date     (H) 07/13/2022        Cardiovascular Disease Screening    Lipid Panel  Cholesterol  Lipoprotein (HDL)  Triglycerides Covered every 5 years for all Medicare beneficiaries without apparent signs or symptoms of cardiovascular disease Lab Results   Component Value Date    CHOLEST 138 06/29/2022    HDL 43 06/29/2022    LDL 66 06/29/2022    TRIG 169 (H) 06/29/2022         Electrocardiogram (EKG)   Covered if needed at Welcome to Medicare, and non-screening if indicated for medical reasons 12/19/2018      Ultrasound Screening for Abdominal Aortic Aneurysm (AAA) Covered once in a lifetime for one of the following risk factors    Men who are 73-68 years old and have ever smoked    Anyone with a family history -     Colorectal Cancer Screening  Covered for ages 52-80; only need ONE of the following:    Colonoscopy   Covered every 10 years    Covered every 2 years if patient is at high risk or previous colonoscopy was abnormal 10/30/2019    Colorectal Cancer Screening due on 10/30/2029    Flexible Sigmoidoscopy   Covered every 4 years -    Fecal Occult Blood Test Covered annually -   Bone Density Screening    Bone density screening    Covered every 2 years after age 72 if diagnosed with risk of osteoporosis or estrogen deficiency. Covered yearly for long-term glucocorticoid medication use (Steroids) Last Dexa Scan:    XR DEXA BONE DENSITOMETRY (CPT=77080) 01/07/2020      No recommendations at this time   Pap and Pelvic    Pap   Covered every 2 years for women at normal risk;  Annually if at high risk -  No recommendations at this time    Chlamydia Annually if high risk -  No recommendations at this time   Screening Mammogram    Mammogram     Recommend annually for all female patients aged 36 and older    One baseline mammogram covered for patients aged 32-38 01/26/2023    Mammogram due on 07/26/2023    Immunizations    Influenza Covered once per flu season  Please get every year 10/21/2022  No recommendations at this time    Pneumococcal Each vaccine (Ifwnhdn15 & Ravojbihq46) covered once after 65 Prevnar 13: 12/30/2015    Saxctzxug06: 12/14/2018     No recommendations at this time    Hepatitis B One screening covered for patients with certain risk factors   -  No recommendations at this time    Tetanus Toxoid Not covered by Medicare Part B unless medically necessary (cut with metal); may be covered with your pharmacy prescription benefits -    Tetanus, Diptheria and Pertusis TD and TDaP Not covered by Medicare Part B -  No recommendations at this time    Zoster Not covered by Medicare Part B; may be covered with your pharmacy  prescription benefits -  No recommendations at this time     Annual Monitoring of Persistent Medications (ACE/ARB, digoxin diuretics, anticonvulsants)    Potassium Annually Lab Results   Component Value Date    K 3.9 12/09/2022         Creatinine   Annually Lab Results   Component Value Date    CREATSERUM 1.44 (H) 07/13/2022         BUN Annually Lab Results   Component Value Date    BUN 33 12/09/2022       Drug Serum Conc Annually No results found for: DIGOXIN, DIG, VALP

## 2023-07-10 RX ORDER — ESCITALOPRAM OXALATE 10 MG/1
TABLET ORAL
Qty: 90 TABLET | Refills: 0 | Status: SHIPPED | OUTPATIENT
Start: 2023-07-10

## 2023-07-10 NOTE — TELEPHONE ENCOUNTER
Future appt: Your appointments       Date & Time Appointment Department [facility])    Jul 11, 2023 11:00 AM CDT Laboratory Visit with [address] Gaffney, Jonathan Gregory ([facility])              [address] [facility] [place]  [address] [id number]  [phone]          Last Appointment with provider:   7/7/2023  [place] [facility] [place]:  7/7/2023  Cholesterol, Total (mg/dL)   Date Value   06/29/2022 138     Total Cholesterol (mg/dL)   Date Value   03/09/2019 127     HDL Cholesterol (mg/dL)   Date Value   06/29/2022 43   03/09/2019 51     LDL Cholesterol (mg/dL)   Date Value   06/29/2022 66   03/09/2019 52     Triglycerides (mg/dL)   Date Value   06/29/2022 169 (H)   03/09/2019 119     Lab Results   Component Value Date     06/29/2022    A1C 6.0 (H) 06/29/2022     Lab Results   Component Value Date    TSH 1.600 06/29/2022     Last RF:  4/17/2023    No follow-ups on file.

## 2023-07-11 ENCOUNTER — LABORATORY ENCOUNTER (OUTPATIENT)
Dept: LAB | Age: 68
End: 2023-07-11
Attending: FAMILY MEDICINE
Payer: MEDICARE

## 2023-07-11 DIAGNOSIS — I10 BENIGN ESSENTIAL HYPERTENSION: ICD-10-CM

## 2023-07-11 DIAGNOSIS — Z00.00 ENCOUNTER FOR ANNUAL HEALTH EXAMINATION: ICD-10-CM

## 2023-07-11 DIAGNOSIS — G47.30 SLEEP APNEA, UNSPECIFIED TYPE: ICD-10-CM

## 2023-07-11 LAB
ALBUMIN SERPL-MCNC: 3.6 G/DL (ref 3.4–5)
ALBUMIN/GLOB SERPL: 1.1 {RATIO} (ref 1–2)
ALP LIVER SERPL-CCNC: 107 U/L
ALT SERPL-CCNC: 24 U/L
ANION GAP SERPL CALC-SCNC: 6 MMOL/L (ref 0–18)
AST SERPL-CCNC: 19 U/L (ref 15–37)
BASOPHILS # BLD AUTO: 0.02 X10(3) UL (ref 0–0.2)
BASOPHILS NFR BLD AUTO: 0.3 %
BILIRUB SERPL-MCNC: 0.3 MG/DL (ref 0.1–2)
BILIRUB UR QL STRIP.AUTO: NEGATIVE
BUN BLD-MCNC: 30 MG/DL (ref 7–18)
CALCIUM BLD-MCNC: 9.3 MG/DL (ref 8.5–10.1)
CHLORIDE SERPL-SCNC: 107 MMOL/L (ref 98–112)
CHOLEST SERPL-MCNC: 150 MG/DL (ref ?–200)
CLARITY UR REFRACT.AUTO: CLEAR
CO2 SERPL-SCNC: 27 MMOL/L (ref 21–32)
COLOR UR AUTO: YELLOW
CREAT BLD-MCNC: 1.17 MG/DL
EOSINOPHIL # BLD AUTO: 0.18 X10(3) UL (ref 0–0.7)
EOSINOPHIL NFR BLD AUTO: 2.6 %
ERYTHROCYTE [DISTWIDTH] IN BLOOD BY AUTOMATED COUNT: 15.2 %
FASTING PATIENT LIPID ANSWER: YES
FASTING STATUS PATIENT QL REPORTED: YES
GFR SERPLBLD BASED ON 1.73 SQ M-ARVRAT: 50 ML/MIN/1.73M2 (ref 60–?)
GLOBULIN PLAS-MCNC: 3.2 G/DL (ref 2.8–4.4)
GLUCOSE BLD-MCNC: 95 MG/DL (ref 70–99)
GLUCOSE UR STRIP.AUTO-MCNC: NEGATIVE MG/DL
HCT VFR BLD AUTO: 35.8 %
HDLC SERPL-MCNC: 48 MG/DL (ref 40–59)
HGB BLD-MCNC: 11.5 G/DL
IMM GRANULOCYTES # BLD AUTO: 0.02 X10(3) UL (ref 0–1)
IMM GRANULOCYTES NFR BLD: 0.3 %
KETONES UR STRIP.AUTO-MCNC: NEGATIVE MG/DL
LDLC SERPL CALC-MCNC: 82 MG/DL (ref ?–100)
LEUKOCYTE ESTERASE UR QL STRIP.AUTO: NEGATIVE
LYMPHOCYTES # BLD AUTO: 1.37 X10(3) UL (ref 1–4)
LYMPHOCYTES NFR BLD AUTO: 19.7 %
MAGNESIUM SERPL-MCNC: 1.8 MG/DL (ref 1.6–2.6)
MCH RBC QN AUTO: 27.4 PG (ref 26–34)
MCHC RBC AUTO-ENTMCNC: 32.1 G/DL (ref 31–37)
MCV RBC AUTO: 85.2 FL
MONOCYTES # BLD AUTO: 0.68 X10(3) UL (ref 0.1–1)
MONOCYTES NFR BLD AUTO: 9.8 %
NEUTROPHILS # BLD AUTO: 4.67 X10 (3) UL (ref 1.5–7.7)
NEUTROPHILS # BLD AUTO: 4.67 X10(3) UL (ref 1.5–7.7)
NEUTROPHILS NFR BLD AUTO: 67.3 %
NITRITE UR QL STRIP.AUTO: NEGATIVE
NONHDLC SERPL-MCNC: 102 MG/DL (ref ?–130)
OSMOLALITY SERPL CALC.SUM OF ELEC: 296 MOSM/KG (ref 275–295)
PH UR STRIP.AUTO: 5 [PH] (ref 5–8)
PLATELET # BLD AUTO: 262 10(3)UL (ref 150–450)
POTASSIUM SERPL-SCNC: 3.7 MMOL/L (ref 3.5–5.1)
PROT SERPL-MCNC: 6.8 G/DL (ref 6.4–8.2)
PROT UR STRIP.AUTO-MCNC: NEGATIVE MG/DL
RBC # BLD AUTO: 4.2 X10(6)UL
SODIUM SERPL-SCNC: 140 MMOL/L (ref 136–145)
SP GR UR STRIP.AUTO: 1.01 (ref 1–1.03)
T4 FREE SERPL-MCNC: 1.2 NG/DL (ref 0.8–1.7)
TRIGL SERPL-MCNC: 108 MG/DL (ref 30–149)
TSI SER-ACNC: 1.89 MIU/ML (ref 0.36–3.74)
UROBILINOGEN UR STRIP.AUTO-MCNC: <2 MG/DL
VIT B12 SERPL-MCNC: >2000 PG/ML (ref 193–986)
VIT D+METAB SERPL-MCNC: 34.5 NG/ML (ref 30–100)
VLDLC SERPL CALC-MCNC: 17 MG/DL (ref 0–30)
WBC # BLD AUTO: 6.9 X10(3) UL (ref 4–11)

## 2023-07-11 PROCEDURE — 82306 VITAMIN D 25 HYDROXY: CPT

## 2023-07-11 PROCEDURE — 84443 ASSAY THYROID STIM HORMONE: CPT

## 2023-07-11 PROCEDURE — 81001 URINALYSIS AUTO W/SCOPE: CPT

## 2023-07-11 PROCEDURE — 85025 COMPLETE CBC W/AUTO DIFF WBC: CPT

## 2023-07-11 PROCEDURE — 83735 ASSAY OF MAGNESIUM: CPT

## 2023-07-11 PROCEDURE — 36415 COLL VENOUS BLD VENIPUNCTURE: CPT

## 2023-07-11 PROCEDURE — 80053 COMPREHEN METABOLIC PANEL: CPT

## 2023-07-11 PROCEDURE — 84439 ASSAY OF FREE THYROXINE: CPT

## 2023-07-11 PROCEDURE — 80061 LIPID PANEL: CPT

## 2023-07-11 PROCEDURE — 82607 VITAMIN B-12: CPT

## 2023-07-11 RX ORDER — FUROSEMIDE 20 MG/1
TABLET ORAL
Qty: 30 TABLET | Refills: 3 | OUTPATIENT
Start: 2023-07-11

## 2023-07-11 NOTE — TELEPHONE ENCOUNTER
Future appt: Your appointments       Date & Time Appointment Department [facility])    Jul 11, 2023 11:00 AM CDT Laboratory Visit with REF [address] Lozano, Jonathan Gregory ([facility])              [address]  [id number]  [phone]          Last Appointment with provider:   7/7/2023  [place] [facility]:  7/7/2023      Furosemide refilled 6/5/23 for #30, 3 refills. Request too soon- declined  Cholesterol, Total (mg/dL)   Date Value   06/29/2022 138     Total Cholesterol (mg/dL)   Date Value   03/09/2019 127     HDL Cholesterol (mg/dL)   Date Value   06/29/2022 43   03/09/2019 51     LDL Cholesterol (mg/dL)   Date Value   06/29/2022 66   03/09/2019 52     Triglycerides (mg/dL)   Date Value   06/29/2022 169 (H)   03/09/2019 119     Lab Results   Component Value Date     06/29/2022    A1C 6.0 (H) 06/29/2022     Lab Results   Component Value Date    TSH 1.600 06/29/2022       No follow-ups on file.

## 2023-07-14 ENCOUNTER — PATIENT OUTREACH (OUTPATIENT)
Dept: CASE MANAGEMENT | Age: 68
End: 2023-07-14

## 2023-07-14 DIAGNOSIS — M05.79 RHEUMATOID ARTHRITIS INVOLVING MULTIPLE SITES WITH POSITIVE RHEUMATOID FACTOR (HCC): ICD-10-CM

## 2023-07-14 DIAGNOSIS — M15.9 PRIMARY OSTEOARTHRITIS INVOLVING MULTIPLE JOINTS: ICD-10-CM

## 2023-07-14 DIAGNOSIS — K90.0 CELIAC DISEASE: ICD-10-CM

## 2023-07-14 DIAGNOSIS — I10 BENIGN ESSENTIAL HYPERTENSION: Primary | ICD-10-CM

## 2023-07-14 DIAGNOSIS — L40.50 PSORIATIC ARTHRITIS (HCC): ICD-10-CM

## 2023-07-14 RX ORDER — POTASSIUM CHLORIDE 20 MEQ/1
20 TABLET, EXTENDED RELEASE ORAL DAILY
Qty: 90 TABLET | Refills: 1 | Status: SHIPPED | OUTPATIENT
Start: 2023-07-14

## 2023-07-14 NOTE — PROGRESS NOTES
7/14/2023  Spoke to Wily for CCM. Updates to patient care team/ comments: UTD  Patient reported changes in medications: reports changes   Med Adherence  Comment: reports adherence     Health Maintenance:     TB Screen Never done  COVID-19 Vaccine(6 - Moderna series) due on 04/09/2023  Mammogram due on 07/26/2023  Influenza Vaccine(1) due on 10/01/2023  Annual Physical due on 07/07/2024  Colorectal Cancer Screening due on 10/30/2029  DEXA Scan Completed  Annual Depression Screening Completed  Fall Risk Screening (Annual) Completed  Pneumococcal Vaccine: 65+ Years Completed  Zoster Vaccines Completed    Patient current concerns: s  Spoke with patient. Patient relates doing ok. Mood good/stable. Denies any recent falls. Urine frequency is uncontrolled. medication no longer working. Reached out to urology and they want patient to schedule a follow up visit. Arthritis improved. Back to receiving injections. Mass on hip- saw Dr. Nancy Holder-- no cancer Considering infection- ID consult, ortho consult. Possible inflammatory reaction- prior hip replacement. Pt returnng [facility]. Mild chronic painDid not see a RD yet for celiac. Breathing stable. Blood pressure better controlled. Pulse stable. Edema stable. No other questions or concerns. Encouraged patient:   Self care: Take the time to do the things you love. Nutrition:  Good nutrition helps us to maintain our weight, fight off infection, and help reduce the risk of developing other chronic issues. Physical activity:  Physical activity is important to help maintain independence and improve quality of life. No future appointments. Previous goal met: continue with active goal     Self Management Goals/Action Plan: Active goal from previous outreach:                       Staying healthy, maintain independence, and stability.      Patient reported progress toward goal: progressing     Patient Reported New Barriers And Concerns: as per  above - Plan for overcoming all barriers: encourged patient to call with any questions or concerns             Patient agrees to goal action plan. Self-Management Abilities (patient reported)             1= least confident in achieving goal, 5= very confident               - confidence: : 3      Care Manager Follow Up: Continue to provide encouragement and education for healthy coping and diagnosis. Reason For Follow Up: review progress and or barriers towards patients goals. Care managers interventions: Continue to provide encouragement and education for healthy coping and diagnosis. Time Spent This Encounter Total: 20 min medical record review, telephone communication, care plan updates where needed, education, goals and action plan recreation/update. Provided acknowledgment and validation to patient's concerns. Monthly Minute Total including today: 20    Physical assessment, complete health history, and care plan established by Colt Borjas MD, need for CCM determined from these assessments and data.

## 2023-07-14 NOTE — TELEPHONE ENCOUNTER
Future appt:    Last Appointment with provider:   7/7/2023- follow up recommended in 6 months  Last appointment at [facility]:  7/7/2023    Bharator Chan refilled 4/17/23 for #90, 0 refills   Cholesterol, Total (mg/dL)   Date Value   07/11/2023 150     Total Cholesterol (mg/dL)   Date Value   03/09/2019 127     HDL Cholesterol (mg/dL)   Date Value   07/11/2023 48   03/09/2019 51     LDL Cholesterol (mg/dL)   Date Value   07/11/2023 82   03/09/2019 52     Triglycerides (mg/dL)   Date Value   07/11/2023 108   03/09/2019 119     Lab Results   Component Value Date     06/29/2022    A1C 6.0 (H) 06/29/2022     Lab Results   Component Value Date    T4F 1.2 07/11/2023    TSH 1.890 07/11/2023       No follow-ups on file.

## 2023-07-17 ENCOUNTER — TELEPHONE (OUTPATIENT)
Dept: FAMILY MEDICINE CLINIC | Facility: CLINIC | Age: 68
End: 2023-07-17

## 2023-07-17 NOTE — TELEPHONE ENCOUNTER
Pt had labs drawn 7/11/23. Received my chart notification re: un reviewed lab report. Pt contacted. Notes from MD review -shared with pt. Pt informed of elevated Vitamin B12 level  Currently pt is taking Solbee BID   (N32, B6, and folic acid combo). Pt states she was previously advised per hematology to take supplement due to elevated homocysteine levels. Pt states she can decrease to once daily if needed. Please advise.

## 2023-08-03 ENCOUNTER — TELEPHONE (OUTPATIENT)
Dept: FAMILY MEDICINE CLINIC | Facility: CLINIC | Age: 68
End: 2023-08-03

## 2023-08-03 DIAGNOSIS — I51.7 LEFT VENTRICULAR HYPERTROPHY: ICD-10-CM

## 2023-08-03 DIAGNOSIS — R93.1 ABNORMAL ECHOCARDIOGRAM: Primary | ICD-10-CM

## 2023-08-03 DIAGNOSIS — I35.0 AORTIC VALVE STENOSIS, ETIOLOGY OF CARDIAC VALVE DISEASE UNSPECIFIED: ICD-10-CM

## 2023-08-03 NOTE — TELEPHONE ENCOUNTER
Please notify patient that her echocardiogram showed a couple of abnormalities-she has mild, concentric, left ventricular hypertrophy, she has abnormal relaxation of the left ventricle, and some mild aortic valve stenosis.   Would recommend following up with cardiology for further evaluation  Referral entered to Dr. Mina Bajwa

## 2023-08-03 NOTE — TELEPHONE ENCOUNTER
Pt informed. Pt agreed with recommendation given. Referral order faxed to Dr. Adriana Perez. Pt agreed to call for appt.

## 2023-08-07 RX ORDER — FUROSEMIDE 20 MG/1
TABLET ORAL
Qty: 180 TABLET | Refills: 1 | Status: SHIPPED | OUTPATIENT
Start: 2023-08-07

## 2023-08-07 NOTE — TELEPHONE ENCOUNTER
Furosemide: 6/5/23  No return date given    Future appt:    Last Appointment with provider:   7/7/2023  Last appointment at [facility]:  7/7/2023  Cholesterol, Total (mg/dL)   Date Value   07/11/2023 150     Total Cholesterol (mg/dL)   Date Value   03/09/2019 127     HDL Cholesterol (mg/dL)   Date Value   07/11/2023 48   03/09/2019 51     LDL Cholesterol (mg/dL)   Date Value   07/11/2023 82   03/09/2019 52     Triglycerides (mg/dL)   Date Value   07/11/2023 108   03/09/2019 119     Lab Results   Component Value Date     06/29/2022    A1C 6.0 (H) 06/29/2022     Lab Results   Component Value Date    T4F 1.2 07/11/2023    TSH 1.890 07/11/2023       No follow-ups on file.

## 2023-08-15 ENCOUNTER — PATIENT OUTREACH (OUTPATIENT)
Dept: CASE MANAGEMENT | Age: 68
End: 2023-08-15

## 2023-08-23 ENCOUNTER — PATIENT MESSAGE (OUTPATIENT)
Dept: FAMILY MEDICINE CLINIC | Facility: CLINIC | Age: 68
End: 2023-08-23

## 2023-08-23 NOTE — TELEPHONE ENCOUNTER
From: Keven Bright  To: Reba Khan MD  Sent: 8/23/2023 1:04 PM CDT  Subject: Tests    Good afternoon, Dr. Jacquelin Yao-    I saw you last month for my medicare annual wellness check and you ordered 2 heart tests for me. Olivia called me with the results and the recommendation from you for me to see a cardiologist. I did call for an appointment with any of the 3 cardiologists at the hospital and the soonest I can get in is the middle of October. I did make that appointment and asked to be put on a wait list if a sooner date opens up. I am in the process of getting into [facility] to have the mass on my left hip evaluated. My question to you is, is it okay to wait until October for the heart appointment of should I have it looked at [facility] when I up there? I donot know yet how soon I can even get into [facility].     Thanks for your assistance,  Angelia Denver

## 2023-09-23 DIAGNOSIS — I10 BENIGN ESSENTIAL HYPERTENSION: Primary | ICD-10-CM

## 2023-09-25 ENCOUNTER — PATIENT OUTREACH (OUTPATIENT)
Dept: CASE MANAGEMENT | Age: 68
End: 2023-09-25

## 2023-09-25 RX ORDER — METOPROLOL SUCCINATE 100 MG/1
100 TABLET, EXTENDED RELEASE ORAL DAILY
Qty: 90 TABLET | Refills: 2 | Status: SHIPPED | OUTPATIENT
Start: 2023-09-25

## 2023-09-25 NOTE — PROGRESS NOTES
AYE verified for CCM monthly outreach. Patient relates doing ok, Being seen by [facility] for mass on left hip. Has upcoming imaging. Has cardiology consult scheduled next month. Patient aware PCP will no longer be part of [facility] starting October 1. Patient confirmed she will continue to see PCP at location and aware it will be [facility]. Patient informed CCM services will be discontinued October 1st due to PCP no longer with [facility],  no other questions or concerns. Patient thanked me for services.      Medical record reviewed including recent office visits and test results

## 2023-09-25 NOTE — TELEPHONE ENCOUNTER
Last Metoprolol  Refill: 5/23/2023 #90 with 0 refills  Last Px: 7/7/2023  F/U Instructions: not on file    Future appt:    Last Appointment with provider:   7/7/2023  [place] [facility]:  7/7/2023  Cholesterol, Total (mg/dL)   Date Value   07/11/2023 150     Total Cholesterol (mg/dL)   Date Value   03/09/2019 127     HDL Cholesterol (mg/dL)   Date Value   07/11/2023 48   03/09/2019 51     LDL Cholesterol (mg/dL)   Date Value   07/11/2023 82   03/09/2019 52     Triglycerides (mg/dL)   Date Value   07/11/2023 108   03/09/2019 119     Lab Results   Component Value Date     06/29/2022    A1C 6.0 (H) 06/29/2022     Lab Results   Component Value Date    T4F 1.2 07/11/2023    TSH 1.890 07/11/2023       No follow-ups on file.

## 2023-10-02 ENCOUNTER — PATIENT OUTREACH (OUTPATIENT)
Dept: CASE MANAGEMENT | Age: 68
End: 2023-10-02

## 2023-10-02 NOTE — PROGRESS NOTES
Patient being removed/un-enroll from CCM due not being eligible. Patient aware services have been discontinued to PCP being outside of [facility].    Removing self from care team.

## 2024-01-22 NOTE — TELEPHONE ENCOUNTER
Blood pressure controlled at 128/80  Continue the following:  Losartan 100 mg/day  Metoprolol succinate 50 mg/day     CT of chest completed at [facility] 8/22/18. Reviewed per CR-   Negative. Pt informed. Copy of CT faxed to Dr. Betty Nicole (ID).

## (undated) DIAGNOSIS — M15.9 PRIMARY OSTEOARTHRITIS INVOLVING MULTIPLE JOINTS: ICD-10-CM

## (undated) DIAGNOSIS — M05.79 RHEUMATOID ARTHRITIS INVOLVING MULTIPLE SITES WITH POSITIVE RHEUMATOID FACTOR (HCC): ICD-10-CM

## (undated) DIAGNOSIS — G62.9 NEUROPATHY: ICD-10-CM

## (undated) DIAGNOSIS — I10 BENIGN ESSENTIAL HYPERTENSION: ICD-10-CM

## (undated) NOTE — LETTER
01/18/19        [address] [facility] [place] [id number]      Dear Yelena Campa,    [facility] records indicate that you have outstanding lab work and or testing that was ordered for you and has not yet been completed:        XR DEXA BONE DENSITOMETRY

## (undated) NOTE — MR AVS SNAPSHOT
[address]  [address] [place] [address] [id number]  [phone]               Thank you for choosing us for your health care visit with DRAKE Gray.   We are glad to serve you and happy to provide you with this summary o Commonly known as:  TESSALON           CITRACAL MAXIMUM 315-250 MG-UNIT Tabs   Generic drug:  Calcium Citrate-Vitamin D           ELIQUIS 5 MG Tabs   Generic drug:  apixaban   Take 5 mg by mouth 2 (two) times daily.            ENBREL 50 MG/ML Sosy   Generic Tips for making healthy food choices  -   Enjoy your food, but eat less. Fully enjoy your food when eating. Don’t eat while distracted and slow down. Avoid over sized portions. Don’t eat while when you’re bored.      EAT THESE FOODS MORE OFTEN: EAT T

## (undated) NOTE — MR AVS SNAPSHOT
[address] [place] [address],  [address]  [phone]               Thank you for choosing us for your health care visit with Betty Medina MD.  We are glad to serve you and happy to provide you with this summary of yo Today's Vital Signs     BP Pulse Temp             128/74 mmHg 64 97.7 °F (36.5 °C) (Tympanic)            Current Medications          This list is accurate as of: 3/23/17  3:41 PM.  Always use your most recent med list.                aspirin 325 MG Tabs Support Staff. Remember, MyChart is NOT to be used for urgent needs. For medical emergencies, dial 911.            Visit [facility] online at  [email or web address]

## (undated) NOTE — LETTER
Date: 12/12/2017    Patient Name: Surya Alan          To Whom it may concern: This letter has been written at the patient's request. The above patient was seen at the [facility] for treatment of a medical condition.     This patient shaila

## (undated) NOTE — LETTER
Coronavirus Disease 2019 (COVID-19)     [place] [address] is committed to the safety and well-being of our patients, members, employees, and communities.  As concerns arise about the new strain of coronavirus that causes COVID-19, [place] [address] 4. If you have a medical appointment, call the healthcare provider ahead of time and tell them that you have or may have COVID-19.  5. For medical emergencies, call 911 and notify the dispatch personnel that you have or may have COVID-19.   6. Cover your c · At least 10 days have passed since symptoms first appeared OR if asymptomatic patient or date of symptom onset is unclear then use 10 days post COVID test date.    · At least 20 days have passed for severe illness (requiring hospitalization) OR if you are *Some people will be required to have a repeat COVID-19 test in order to be eligible to donate. If you’re instructed by Julien Law that a repeat test is required, please contact the [facility] COVID-19 Nurse Triage Line at [phone].     Additional Inf

## (undated) NOTE — MR AVS SNAPSHOT
[address]  [address] [place] [phone]  [phone]               Thank you for choosing us for your health care visit with Norma Blanchard MD.  We are glad to serve you and happy to provide you with this summary o Take 5 mg by mouth 2 (two) times daily. 10 mg until 3/29 then to 5mg twice a day           ENBREL 50 MG/ML Sosy   Generic drug:  Etanercept   50 mg once a week. LEXAPRO 10 MG Tabs   Generic drug:  escitalopram   Take 10 mg by mouth daily.

## (undated) NOTE — MR AVS SNAPSHOT
[address]  [address] [place] [address] [phone]  [phone]               Thank you for choosing us for your health care visit with Joanie Teresa MD.  We are glad to serve you and happy to provide you with this summary o CITRACAL MAXIMUM 315-250 MG-UNIT Tabs   Generic drug:  Calcium Citrate-Vitamin D   Take 1 tablet by mouth 2 (two) times daily. DUREZOL 0.05 % Emul   Generic drug:  Difluprednate   1 drop 4 (four) times daily.            ELIQUIS 5 MG Tabs   Generi Visit [facility] online at  [email or web address]